# Patient Record
Sex: FEMALE | Race: WHITE | Employment: OTHER | URBAN - METROPOLITAN AREA
[De-identification: names, ages, dates, MRNs, and addresses within clinical notes are randomized per-mention and may not be internally consistent; named-entity substitution may affect disease eponyms.]

---

## 2017-02-01 ENCOUNTER — HOSPITAL ENCOUNTER (OUTPATIENT)
Dept: PHYSICAL THERAPY | Age: 82
Discharge: HOME | End: 2017-02-01
Payer: MEDICARE

## 2017-02-01 PROCEDURE — G8978 MOBILITY CURRENT STATUS: HCPCS

## 2017-02-01 PROCEDURE — 97140 MANUAL THERAPY 1/> REGIONS: CPT

## 2017-02-01 PROCEDURE — 97162 PT EVAL MOD COMPLEX 30 MIN: CPT

## 2017-02-01 PROCEDURE — G8979 MOBILITY GOAL STATUS: HCPCS

## 2017-02-01 NOTE — PROGRESS NOTES
PHYSICAL THERAPY - DAILY TREATMENT NOTE    Patient Name: Ni France        Date: 2017  : 1926    Patient  Verified: YES  Visit #:   1   of   8  Insurance: Payor: Tristan Cody / Plan: [place] MED[place]ARE PART A & B / Product Type: Medicare /      In time: 105 Out time: 150   Total Treatment Time: 45     Medicare Time Tracking (below)   Total Timed Codes (min):  10 1:1 Treatment Time:  10     TREATMENT AREA/ DIAGNOSIS = Low back pain [M54.5]    SUBJECTIVE  Pain Level (on 0 to 10 scale):  0  / 10   Medication Changes/New allergies or changes in medical history, any new surgeries or procedures?     NO    If yes, update Summary List   Subjective Functional Status/Changes:  []  No changes reported     Please see eval.      OBJECTIVE  Modalities Rationale: [] decrease edema/ inflammation  [] decrease pain   [] increase tissue extensibility  [] increase muscle performance  [] decrease neural compromise  to improve patient's ability to [] perform ADLs   [] ambulate  [] perform work  [] relaxation/ sleep      min [] Estim, type/location:                                      []  att     []  unatt     []  w/US     []  w/ice    []  w/heat    min []  Mechanical Traction: type/lbs                   []  pro   []  sup   []  int   []  cont    []  before manual    []  after manual    min []  Ultrasound, settings/location:      min []  Iontophoresis w/ dexamethasone, location:                                               []  take home patch       []  in clinic    min []  Ice     []  Heat    location/position:     min []  Vasopneumatic Device, press/temp:     min []  Other:    [] Skin assessment post-treatment (if applicable):    []  intact    []  redness- no adverse reaction     []redness  adverse reaction:          min Therapeutic Exercise:  [x]  See flow sheet   Rationale:  [] increase ROM   [] increase strength   [] increase endurance   [] increase motor control   [] other  to improve patients ability to [] perform ADLs   [] ambulate  [] perform work  [] relaxation/ sleep      10 min Manual Therapy: Technique:         [] STM[]ASTM[x]TPR[]PROM[] Stretching  []Jt manipulation []Gr I [] II []  III [] IV[] V[]  Treatment Area:  R glute max TPR  Other:   Rationale: [x] decrease pain   [x] decrease TP [] increase ROM/ mobility   [] increase tissue extensibility   [] decrease edema   [] reduce disc [] postural correction   [] other     to improve patient's ability to [x] perform ADLs   [x] ambulate  [] perform work  [] relaxation/ sleep       min Therapeutic Activity:  [] see flow sheet   Rationale:[] increase ROM   [] increase strength   [] increase balance/ proprioception   [] increase motor control   [] other   to improve patients ability to [] perform ADLs   [] ambulate  [] perform work  [] relaxation/ sleep      min Neuromuscular Re-ed:  [] see flow sheet   Rationale:[] increase ROM   [] increase strength   [] increase balance/ proprioception   [] increase motor control  [] improve safety  [] other    to improve patients ability to[] perform ADLs   [] ambulate  [] perform work  [] relaxation/ sleep                min Gait Training: To improve patients ability to:    [] perform ADLs   [] ambulate       min Patient Education:  Yes    [x] Reviewed HEP   []  Progressed/Changed HEP based on: Other Objective/Functional Measures:    Please see POC. Post Treatment Pain Level (on 0 to 10) scale:   0  / 10     ASSESSMENT  []  See Progress Note/Recertification      Patient will continue to benefit from skilled PT services to:  PLEASE SEE POC/SET GOALS.    Progress toward goals / Updated goals:    [] decr pain   []inc stability   []inc balance [] inc ROM[] inc strength  [] centralizing symptoms                    [] progressing  Function  [] progressing towards LTGs            []  progressing HEP       PLAN  [x]  Upgrade activities as tolerated YES Continue plan of care   []  Discharge due to :    []  Other:      Therapist: Ileana Jeffers PT, DPT, MTC, CMTPT    Date: 2/1/2017 Time: 2:54 PM        No future appointments.

## 2017-02-01 NOTE — PROGRESS NOTES
[facility] PHYSICAL THERAPY AT [facility] [address] [place] [facility] - Phone: [phone]  Fax: [phone] / [address] [facility]  Patient Name: Ni France : 1926   Medical   Diagnosis: Low back pain [M54.5] Treatment Diagnosis: LBP, L knee pain   Onset Date: Chronic LBP, knee pain 3 mo. ago     Referral Source: Hannah Ponce [facility]): 2017   Prior Hospitalization: See medical history Provider #: [id number]   Prior Level of Function: Tolerable LBP   Comorbidities: OP, arthritis, pacemaker, alcohol use, HTN, CA   Medications: Verified on Patient Summary List   The Plan of Care and following information is based on the information from the initial evaluation.   ===========================================================================================  Assessment / key information:  The patient is a 80 y.o. female who presents to the clinic with main c/o R-sided LBP with ambulation > 2/3 of a block, and L knee pain x 3 mos. Exacerbated by stairs, getting out of a chair. One recent incident of 10/10 back pain that felt like a spasm and lasted for 2-3 days after attempting to get in bed. Pain level is reported as 10/10 at max and 0/10 at best. Patient experiences no LBP with sitting. Objective evaluation reveals: (1) FOTO=TBD. (2) gait: L hip in ER, use of adductors for forward limb advancement, L trunk lean during L stance, antalgic. (3) lumbar AROM: WFL without pain, patient reports lumbar FLX \"feels good\". (4) fxl mobility: sit to stand limited by LE strength and L knee pain. (5) strength: B glute meds 3/5. TrPs present in R glutes and L quad. Would like to address L knee pain under this course of care. These impairments are preventing patient from full participation in an exercise program of walking, limiting walking tolerance.  The patient would benefit from skilled physical therapy services in order to decrease pain and improve activity tolerance and functional mobility.   ===========================================================================================  Eval Complexity: History MEDIUM  Complexity : 1-2 comorbidities / personal factors will impact the outcome/ POC ;  Examination  HIGH Complexity : 4+ Standardized tests and measures addressing body structure, function, activity limitation and / or participation in recreation ; Presentation LOW Complexity : Stable, uncomplicated ;  Decision Making Other outcome measures Professional Judgment  MEDIUM; Overall Complexity LOW   Problem List: pain affecting function, decrease ROM, decrease strength, impaired gait/ balance, decrease ADL/ functional abilitiies, decrease activity tolerance and decrease transfer abilities FOTO = TBD  Treatment Plan may include any combination of the following: Therapeutic exercise, Therapeutic activities, Neuromuscular re-education, Physical agent/modality, Gait/balance training, Manual therapy, Patient education, Self Care training, Functional mobility training and Stair training  Patient / Family readiness to learn indicated by: asking questions, trying to perform skills and interest  Persons(s) to be included in education: patient (P) and family support person (FSP);list NA  Barriers to Learning/Limitations: None  Measures taken: NA   Patient Goal (s): \"less discomfort\"   Patient self reported health status: excellent  Rehabilitation Potential: good   Short Term Goals: To be accomplished in  2-3  weeks:  1. Establish HEP and consistent compliance with instructions. 2. Decrease max pain to < or = 6/10 for activity tolerance. 3. The patient will be able to stand up from chair 3x without UE assistance.  Long Term Goals: To be accomplished in  4-6  weeks:  1. The patient will be independent in HEP in preparation for discharge.   2. Improve functional ability as evidenced by a score improvement of > or = 6 points on FOTO. 3. Decrease man pain to < or = 3/10 for activity tolerance. 4. The patient will be able ot stand up from chair 7x without UE assistance. 5. The patient will be able to walk > or = 1 block without symptoms for ambulating for exercise program.   Frequency / Duration:   Patient to be seen  2  times per week for 4-6  weeks:  Patient / Caregiver education and instruction: self care, activity modification and exercises  G-Codes (GP): Mobility: [id number] Current  CK= 40-59%   Goal  CJ= 20-39%. The severity rating is based on the Other Professional judgment    Therapist Signature: Soco Woods, PT, DPT, MTC, CMTPT Date: 5/9/5685   Certification Period: 2/1/17 to 4/29/17 Time: 2:55 PM   ===========================================================================================  I certify that the above Physical Therapy Services are being furnished while the patient is under my care. I agree with the treatment plan and certify that this therapy is necessary. Physician Signature:        Date:       Time:     Please sign and return to [facility] [place] or you may fax the signed copy to [phone]. Thank you.

## 2017-02-06 ENCOUNTER — HOSPITAL ENCOUNTER (OUTPATIENT)
Dept: PHYSICAL THERAPY | Age: 82
Discharge: HOME | End: 2017-02-06
Payer: MEDICARE

## 2017-02-06 PROCEDURE — 97110 THERAPEUTIC EXERCISES: CPT

## 2017-02-06 PROCEDURE — 97140 MANUAL THERAPY 1/> REGIONS: CPT

## 2017-02-06 NOTE — PROGRESS NOTES
PHYSICAL THERAPY - DAILY TREATMENT NOTE      Patient Name: Ni France        Date: 2017  : 1926   YES Patient  Verified  Visit #:   2  of   8  Insurance: Payor: Wilfrid Moscoso / Plan: [place] MEDICARE PART A & B / Product Type: Medicare /      In time: 300 Out time: 350   Total Treatment Time: 50     Medicare Time Tracking (below)   Total Timed Codes (min):  50 1:1 Treatment Time:  40     TREATMENT AREA =  R LBP, L knee pain. SUBJECTIVE    Pain Level (on 0 to 10 scale):  4  / 10   Medication Changes/New allergies or changes in medical history, any new surgeries or procedures? NO    If yes, update Summary List   Subjective Functional Status/Changes:  []  No changes reported       Functional improvements: pt states difficulty walking today from L knee pain. Functional impairments: difficulty with sit<>stand and ascending stairs x 4. OBJECTIVE       30, 15 min 1:1 min Therapeutic Exercise:  [x]  See flow sheet   Rationale:      increase ROM and increase strength to improve the patients ability to transfer sit<>stand        15 min Manual Therapy: Technique:      [x] S/DTM [x]IASTM []PROM [] Passive Stretching   [x]manual TPR    []Jt manipulation:Gr I [] II []  III [] IV[] V[]  Treatment Area:  R gluteals in S/L, L knee IASTM quad and infrapatellar region   Rationale:      decrease pain, increase ROM and increase tissue extensibility to improve WB for transfers       min Gait Training:    Rationale:        5 min Patient Education:  YES  Reviewed HEP   []  Progressed/Changed HEP based on: Other Objective/Functional Measures: Added gluteal strengthening and lumbar flexion stretching to there ex program and discussed with addition to HEP. Post Treatment Pain Level (on 0 to 10) scale:   2   10     ASSESSMENT    Assessment/Changes in Function:     Pt with compensation noted during lateral band walk, improved with shorter strides.      []  See Progress Note/Recertification   Patient will continue to benefit from skilled PT services to modify and progress therapeutic interventions, address functional mobility deficits, address ROM deficits, address strength deficits, analyze and address soft tissue restrictions, analyze and cue movement patterns, analyze and modify body mechanics/ergonomics, assess and modify postural abnormalities and instruct in home and community integration to attain remaining goals. to attain remaining goals. Progress toward goals / Updated goals: · Short Term Goals: To be accomplished in 2-3 weeks:  1. Establish HEP and consistent compliance with instructions--50% met, will need further verbal and tactile cues to meet goal.     PLAN    []  Upgrade activities as tolerated YES Continue plan of care   []  Discharge due to :    [x]  Other: Add distraction for L knee at next visit. Emphasis continues on gluteal strengthening.      Therapist: Chloé Gonzalez PT    Date: 2/6/2017 Time: 2:35 PM   Future Appointments  Date Time Provider Lana Sharma   2/6/2017 3:00 PM Lucia Samson [facility]   2/8/2017 3:30 PM Armani Zuñiga PT [facility] AT [facility]

## 2017-02-08 ENCOUNTER — HOSPITAL ENCOUNTER (OUTPATIENT)
Dept: PHYSICAL THERAPY | Age: 82
Discharge: HOME | End: 2017-02-08
Payer: MEDICARE

## 2017-02-08 PROCEDURE — 97140 MANUAL THERAPY 1/> REGIONS: CPT

## 2017-02-08 PROCEDURE — 97110 THERAPEUTIC EXERCISES: CPT

## 2017-02-08 NOTE — PROGRESS NOTES
PHYSICAL THERAPY - DAILY TREATMENT NOTE    Patient Name: Ni France        Date: 2017  : 1926   YES Patient  Verified  Visit #:   3      8  Insurance: Payor: Karina Bee / Plan: [place] MEDICARE PART A & B / Product Type: Medicare /      In time: 335 Out time: 430   Total Treatment Time: 55     TREATMENT AREA =  Low back pain [M54.5]    SUBJECTIVE  Pain Level (on 0 to 10 scale):  5-6  / 10   Medication Changes/New allergies or changes in medical history, any new surgeries or procedures? NO    If yes, update Summary List   Subjective Functional Status/Changes:  []  No changes reported     Functional improvements: back feels looser than the other day while stretching  Functional impairments: walking, L knee and R hip pain       OBJECTIVE    15 min Manual Therapy: Technique:      [] S/DTM [x]IASTM []PROM [] Passive Stretching   [x]manual TPR  [] TDN (see objective)  [x]Jt manipulation:Gr I [] II []  III [] IV[x] V[]  Treatment/Area:  L knee IASTM to peripatellar area, sup glides L patella, manual TPR to R glute max and med in s/l, manual assist for eccentric lowering R glute med   Rationale:      decrease pain, increase ROM and decrease trigger points to improve patient's ability to ambulate    40 total, 15 1:1 min Therapeutic Exercise:  [x]  See flow sheet   Rationale:      increase ROM and increase strength to improve the patients ability to ambulate, sit to stand        min Patient Education:  YES  Reviewed HEP   []  Progressed/Changed HEP based on:         Other Objective/Functional Measures:    Able to tolerate eccentric hip ABD with assist, UA to abduct R hip in side-lying against gravity, uses TFL to compensate    Patient inquires as so the cause/effect relationship of her L LE swelling and the knee pain, which came first? She was told that the vein stripping she received years ago could cause LE edema later on     Post Treatment Pain Level (on 0 to 10) scale:   2  / 10 ASSESSMENT  Assessment/Changes in Function:     Able to decrease pain and compensations with verbal cuing and manual therapy     []  See Progress Note/Recertification   Patient will continue to benefit from skilled PT services to modify and progress therapeutic interventions, address functional mobility deficits, address strength deficits, analyze and address soft tissue restrictions, analyze and cue movement patterns, analyze and modify body mechanics/ergonomics and instruct in home and community integration to attain remaining goals. Progress toward goals / Updated goals:    1. Establish HEP and consistent compliance with instructions. 2. Decrease max pain to < or = 6/10 for activity tolerance. 3. The patient will be able to stand up from chair 3x without UE assistance.       PLAN  [x]  Upgrade activities as tolerated YES Continue plan of care   []  Discharge due to :    []  Other:      Therapist: Edda Box, PT, DPT, MTC, CMTPT    Date: 2/8/2017 Time: 3:41 PM     Future Appointments  Date Time Provider Lana Sharma   2/15/2017 1:00 PM Lucia Aragon [facility] AT [facility]   2/17/2017 9:30 AM Edel Daugherty, PT [facility] AT [facility]

## 2017-02-14 ENCOUNTER — HOSPITAL ENCOUNTER (OUTPATIENT)
Dept: PHYSICAL THERAPY | Age: 82
Discharge: HOME | End: 2017-02-14
Payer: MEDICARE

## 2017-02-14 PROCEDURE — 97110 THERAPEUTIC EXERCISES: CPT

## 2017-02-14 NOTE — PROGRESS NOTES
PHYSICAL THERAPY - DAILY TREATMENT NOTE      Patient Name: Ni France        Date: 2017  : 1926   YES Patient  Verified  Visit #:   4   of   8  Insurance: Payor: Marian Hough / Plan: [place] MEDICARE PART A & B / Product Type: Medicare /      In time: 335 Out time: 430   Total Treatment Time: 55     Medicare Time Tracking (below)   Total Timed Codes (min):  40 1:1 Treatment Time:  40     TREATMENT AREA =  Low back pain [M54.5]    SUBJECTIVE    Pain Level (on 0 to 10 scale):  0 at rest, sitting; pain with walking in L knee  / 10   Medication Changes/New allergies or changes in medical history, any new surgeries or procedures?     NO    If yes, update Summary List   Subjective Functional Status/Changes:  []  No changes reported       Functional improvements: \"back feels pretty good\", knee hurts  Functional impairments: pain with ambulation, stairs         OBJECTIVE  Modalities Rationale:     decrease pain and increase tissue extensibility to improve patient's ability to ambulate      min [] Estim, type/location:                                      []  att     []  unatt     []  w/US     []  w/ice    []  w/heat    min []  Mechanical Traction: type/lbs                   []  pro   []  sup   []  int   []  cont    []  before manual    []  after manual    min []  Ultrasound, settings/location:      min []  Iontophoresis w/ dexamethasone, location:                                               []  take home patch       []  in clinic   15 min []  Ice     [x]  Heat    location/position: L s/l to R hip    min []  Vasopneumatic Device, press/temp:     min []  Other:    [x] Skin assessment post-treatment (if applicable):    [x]  intact    [x]  redness- no adverse reaction     []redness  adverse reaction:      40 min Therapeutic Exercise:  [x]  See flow sheet   Rationale:      increase strength and increase proprioception to improve the patients ability to ambulate      min Patient Education:  YES  Reviewed HEP   [] Progressed/Changed HEP based on: Other Objective/Functional Measures:    Issued HEP for focus on glute med and quad strengthening     Post Treatment Pain Level (on 0 to 10) scale:   0  / 10     ASSESSMENT    Assessment/Changes in Function:     Increasing activity tolerance according to LBP, no change in L knee  Weakness B LEs as evidenced by sit to stand weakness     []  See Progress Note/Recertification   Patient will continue to benefit from skilled PT services to modify and progress therapeutic interventions, address functional mobility deficits, address ROM deficits, address strength deficits, analyze and address soft tissue restrictions, analyze and cue movement patterns, analyze and modify body mechanics/ergonomics and instruct in home and community integration to attain remaining goals. to attain remaining goals. Progress toward goals / Updated goals:    1. Establish HEP and consistent compliance with instructions. 2. Decrease max pain to < or = 6/10 for activity tolerance. --<6/10 pain today  3. The patient will be able to stand up from chair 3x without UE assistance.       PLAN    [x]  Upgrade activities as tolerated YES Continue plan of care   []  Discharge due to :    []  Other:      Therapist: Soco Woods, PT, DPT, MTC, CMTPT    Date: 2/14/2017 Time: 3:30 PM   Future Appointments  Date Time Provider Lana Sharma   2/17/2017 9:30 AM Eva Martinez, PT [facility] AT [facility]   2/21/2017 12:30 PM Chloé Gonzalez, PT [facility] AT [facility]   2/24/2017 9:30 AM Eva Martinez, PT [facility] AT [facility]   3/1/2017 11:00 AM Eva Martinez, PT [facility] AT [facility]   3/3/2017 10:30 AM Eva Martinez, PT [facility] AT [facility]   3/6/2017 10:00  [address], PT [facility] AT [facility]   3/10/2017 3:30 PM Chloé Gonzalez, PT [facility] AT [facility]

## 2017-02-15 ENCOUNTER — APPOINTMENT (OUTPATIENT)
Dept: PHYSICAL THERAPY | Age: 82
End: 2017-02-15
Payer: MEDICARE

## 2017-02-17 ENCOUNTER — HOSPITAL ENCOUNTER (OUTPATIENT)
Dept: PHYSICAL THERAPY | Age: 82
Discharge: HOME | End: 2017-02-17
Payer: MEDICARE

## 2017-02-17 PROCEDURE — 97140 MANUAL THERAPY 1/> REGIONS: CPT

## 2017-02-17 NOTE — PROGRESS NOTES
PHYSICAL THERAPY - DAILY TREATMENT NOTE    Patient Name: Ni France        Date: 2017  : 1926   YES Patient  Verified  Visit #:   5   of   8  Insurance: Payor: Renee Palm / Plan: [place] MEDICARE PART A & B / Product Type: Medicare /      In time: 0 Out time:    Total Treatment Time: 60     Medicare Time Tracking (below)   Total Timed Codes (min):  60 1:1 Treatment Time:  25     TREATMENT AREA =  Low back pain [M54.5]    SUBJECTIVE  Pain Level (on 0 to 10 scale):  0 at rest  / 10   Medication Changes/New allergies or changes in medical history, any new surgeries or procedures? NO    If yes, update Summary List   Subjective Functional Status/Changes:  []  No changes reported     Functional improvements: performing stretching at home  Functional impairments: pain and difficulty with sit to stand       OBJECTIVE    25  1:1 min Manual Therapy: Technique:      [x] S/DTM [x]IASTM []PROM [] Passive Stretching   [x]manual TPR  [] TDN (see objective)  []Jt manipulation:Gr I [] II []  III [] IV[] V[]  Treatment/Area:  L knee and R lumbar/hip   Rationale:      decrease pain, increase ROM, increase tissue extensibility and decrease trigger points to improve patient's ability to sit to stand without pain    35 NB min Therapeutic Exercise:  [x]  See flow sheet   Rationale:      increase ROM and increase strength to improve the patients ability to sit to stand, ambulate      min Patient Education:  YES  Reviewed HEP   []  Progressed/Changed HEP based on:         Other Objective/Functional Measures:    TTP of tissue at sup lat corner of patella  Patient continues to report snapping during knee FLX     Post Treatment Pain Level (on 0 to 10) scale:   0  / 10     ASSESSMENT  Assessment/Changes in Function:     Slow progress with sit to stand mobility     []  See Progress Note/Recertification   Patient will continue to benefit from skilled PT services to modify and progress therapeutic interventions to attain remaining goals. Progress toward goals / Updated goals:    1. Establish HEP and consistent compliance with instructions. 2. Decrease max pain to < or = 6/10 for activity tolerance. --<6/10 pain today  3. The patient will be able to stand up from chair 3x without UE assistance.       PLAN  [x]  Upgrade activities as tolerated YES Continue plan of care   []  Discharge due to :    []  Other:      Therapist: Abiola Mcclain, PT, DPT, MTC, CMTPT    Date: 2/17/2017 Time: 9:32 AM     Future Appointments  Date Time Provider Lana Sharma   2/21/2017 12:30 PM Lien Renner PT [facility] AT [facility]   2/24/2017 9:30 AM Armani Zuñiga, PT [facility] AT [facility]   3/1/2017 11:00 AM Armani Zuñiga, PT [facility] AT [facility]   3/3/2017 10:30 AM Armani Zuñiga PT [facility] AT [facility]   3/6/2017 10:00 AM Lien Renner PT [facility] AT [facility]   3/10/2017 3:30 PM Lien Renner, PT [facility] AT [facility]

## 2017-02-21 ENCOUNTER — HOSPITAL ENCOUNTER (OUTPATIENT)
Dept: PHYSICAL THERAPY | Age: 82
Discharge: HOME | End: 2017-02-21
Payer: MEDICARE

## 2017-02-21 PROCEDURE — 97110 THERAPEUTIC EXERCISES: CPT

## 2017-02-21 PROCEDURE — 97140 MANUAL THERAPY 1/> REGIONS: CPT

## 2017-02-21 NOTE — PROGRESS NOTES
PHYSICAL THERAPY - DAILY TREATMENT NOTE    Patient Name: Ni France        Date: 2017  : 1926   YES Patient  Verified  Visit #:   6   of   8  Insurance: Payor: Dharmesh Barlow / Plan: [place] MED[place] & B / Product Type: Medicare /      In time: 90 Out time: 120   Total Treatment Time: 70     Medicare Time Tracking (below)   Total Timed Codes (min):  60 1:1 Treatment Time:  40     TREATMENT AREA =  Low back pain [M54.5]    SUBJECTIVE  Pain Level (on 0 to 10 scale): 3  / 10--R lumbar and L knee pain   Medication Changes/New allergies or changes in medical history, any new surgeries or procedures? NO    If yes, update Summary List   Subjective Functional Status/Changes:  []  No changes reported     Functional improvements: pt reporting knee pain is most limiting factor in mobility today. Has not been compliant with HEP. Functional impairments: difficulty with sit<>Stand, ambulation on uneven surfaces.         OBJECTIVE  Modalities Rationale:   Decrease pain to improve patient's ability to ambulate with improved WB B LE.     min [] Estim, type/location:                                      []  att     []  unatt     []  w/US     []  w/ice    []  w/heat    min []  Mechanical Traction: type/lbs                   []  pro   []  sup   []  int   []  cont    []  before manual    []  after manual    min []  Ultrasound, settings/location:      min []  Iontophoresis w/ dexamethasone, location:                                               []  take home patch       []  in clinic   10 min []  Ice     [x]  Heat    location/position: Sidelying to lumbar and R hip    min []  Vasopneumatic Device, press/temp:     min []  Other:    [x] Skin assessment post-treatment (if applicable):    [x]  intact    [x]  redness- no adverse reaction     []redness  adverse reaction:    25    min Manual Therapy: Technique:      [x] S/DTM [x]IASTM []PROM [] Passive Stretching   [x]manual TPR  [] TDN (see objective)  [x]Jt manipulation:Gr I [x] II [x]  III [] IV[] V[]  Treatment/Area:  L knee and R lumbar, R hip lateral and posterior, LAD, knee ext and flexion patellar glides. Rationale:      decrease pain, increase ROM, increase tissue extensibility and decrease trigger points to improve patient's ability to sit to stand without pain    35/ 1:1 15 min min Therapeutic Exercise:  [x]  See flow sheet   Rationale:      increase ROM and increase strength to improve the patients ability to sit to stand, ambulate      min Patient Education:  YES  Reviewed HEP   []  Progressed/Changed HEP based on: Other Objective/Functional Measures:    B gluteal strength at 3/5, addressed firing patterns t/o tx. Post Treatment Pain Level (on 0 to 10) scale:  2 at L knee and R hip, lumbar  / 10     ASSESSMENT  Assessment/Changes in Function:   Gradual progression noted with transfer sit<>stand, gait with reduction in compensation. []  See Progress Note/Recertification   Patient will continue to benefit from skilled PT services to modify and progress therapeutic interventions to attain remaining goals. Progress toward goals / Updated goals:    1. Establish HEP and consistent compliance with instructions--pt is not consistent per pt. 2. Decrease max pain to < or = 6/10 for activity tolerance. --<6/10 pain today, goal met x 1 day. 3. The patient will be able to stand up from chair 3x without UE assistance--pt at 1 trial currently met without use of UE, increased height, at 22 inches.       PLAN  [x]  Upgrade activities as tolerated YES Continue plan of care   []  Discharge due to :    []  Other:      Therapist: Barbara Escalante PT, Shaquille SIERRA 62      Date: 2/21/2017 Time: 1249p     Future Appointments  Date Time Provider Lana Sharma   2/24/2017 9:30 AM Brandi Dave PT REHAB CENTER AT [facility]   3/1/2017 11:00 AM Brandi Dave PT RE[facility]AB [facility] AT [facility]   3/3/2017 10:30 AM Brandi Dave PT REHAB CENTER AT [facility]   3/6/2017 10:00  [address], PT REHAB CENTER AT [facility] 3/10/2017 3:30  [address], PT REHAB CENTER AT [facility]

## 2017-02-24 ENCOUNTER — HOSPITAL ENCOUNTER (OUTPATIENT)
Dept: PHYSICAL THERAPY | Age: 82
Discharge: HOME | End: 2017-02-24
Payer: MEDICARE

## 2017-02-24 PROCEDURE — 97110 THERAPEUTIC EXERCISES: CPT

## 2017-02-24 PROCEDURE — 97140 MANUAL THERAPY 1/> REGIONS: CPT

## 2017-02-24 NOTE — PROGRESS NOTES
PHYSICAL THERAPY - DAILY TREATMENT NOTE    Patient Name: Ni France        Date: 2017  : 1926   YES Patient  Verified  Visit #:   7   of   8  Insurance: Payor: [employer] / Plan: [place] MEDICARE PART A & B / Product Type: Medicare /      In time: 444 Out time: 1030   Total Treatment Time: 55     Medicare Time Tracking (below)   Total Timed Codes (min):  45 1:1 Treatment Time:  45     TREATMENT AREA =  Low back pain [M54.5]    SUBJECTIVE  Pain Level (on 0 to 10 scale):  0  / 10   Medication Changes/New allergies or changes in medical history, any new surgeries or procedures?     NO    If yes, update Summary List   Subjective Functional Status/Changes:  []  No changes reported     Functional improvements: no pain since Cortisone injection on Tuesday, improved walking  Functional impairments: sit to stand, fxl mobility        OBJECTIVE    25 min Manual Therapy: Technique:      [x] S/DTM []IASTM []PROM [x] Passive Stretching   [x]manual TPR  [] TDN (see objective)  []Jt manipulation:Gr I [] II []  III [] IV[] V[]  Treatment/Area:  L knee CFM to patellar tendon, MFR around patella, TPR to L quad, Ant glide of tib on fib in h/l; TPR to R QL and glutes in L s/l, passive hip flexor stretch   Rationale:      decrease pain, increase ROM, increase tissue extensibility and decrease trigger points to improve patient's ability to ambulate    20 min Therapeutic Exercise:  [x]  See flow sheet   Rationale:      increase ROM and increase strength to improve the patients ability to ambulate, sit to stand      Modalities Rationale:     decrease pain and increase tissue extensibility to improve patient's ability to sit to stand, ambulate   min [] Estim, type/location:                                      []  att     []  unatt     []  w/US     []  w/ice    []  w/heat    min []  Mechanical Traction: type/lbs                   []  pro   []  sup   []  int   []  cont    []  before manual    []  after manual    min [] Ultrasound, settings/location:      min []  Iontophoresis w/ dexamethasone, location:                                               []  take home patch       []  in clinic   10 min []  Ice     [x]  Heat    location/position: L s/l    min []  Vasopneumatic Device, press/temp:     min []  Other:    [x] Skin assessment post-treatment (if applicable):    [x]  intact    [x]  redness- no adverse reaction     []redness  adverse reaction:         min Patient Education:  YES  Reviewed HEP   []  Progressed/Changed HEP based on: Other Objective/Functional Measures:    Patient reports no pain in L knee since cortisone injection (only lasted 2 days the last time patient received one)  Most TTP in R glute max, maricarmen just lateral to glute max  Incision site from R THR , encouraged patient to perform scar massage after showering     Post Treatment Pain Level (on 0 to 10) scale:   0  / 10     ASSESSMENT  Assessment/Changes in Function:     Compensations for L knee pain a contributing factor to R LBP  Post lat approach for R THR   []  See Progress Note/Recertification   Patient will continue to benefit from skilled PT services to modify and progress therapeutic interventions to attain remaining goals. Progress toward goals / Updated goals:    1. Establish HEP and consistent compliance with instructions--pt is not consistent per pt. 2. Decrease max pain to < or = 6/10 for activity tolerance. --<6/10 pain today, goal met x 1 day. 3. The patient will be able to stand up from chair 3x without UE assistance--table height at 21 inches.       PLAN  [x]  Upgrade activities as tolerated YES Continue plan of care   []  Discharge due to :    []  Other:      Therapist: Audley Mortimer, PT, DPT, MTC, CMTPT    Date: 2/24/2017 Time: 9:31 AM     Future Appointments  Date Time Provider Lana Sharma   3/1/2017 11:00 AM Jessie Laurent PT REHAB CENTER AT [address]   3/3/2017 10:30 AM Jessie Laurent PT REHAB CENTER AT [address]   3/6/2017 10:00 AM Edel Thomas Night REHAB CENTER AT [facility]   3/10/2017 3:30  [address], PT REHAB CENTER AT [facility]

## 2017-03-01 ENCOUNTER — HOSPITAL ENCOUNTER (OUTPATIENT)
Dept: PHYSICAL THERAPY | Age: 82
Discharge: HOME | End: 2017-03-01
Payer: MEDICARE

## 2017-03-01 PROCEDURE — G8979 MOBILITY GOAL STATUS: HCPCS

## 2017-03-01 PROCEDURE — 97140 MANUAL THERAPY 1/> REGIONS: CPT

## 2017-03-01 PROCEDURE — G8978 MOBILITY CURRENT STATUS: HCPCS

## 2017-03-01 NOTE — PROGRESS NOTES
PHYSICAL THERAPY - DAILY TREATMENT NOTE    Patient Name: Ni France        Date: 3/1/2017  : 1926   YES Patient  Verified  Visit #:     Insurance: Payor: Edgar Signs / Plan: [place] MEDICARE PART A & B / Product Type: Medicare /      In time: 2 Out time: 1150   Total Treatment Time: 45     Medicare Time Tracking (below)   Total Timed Codes (min):  45 1:1 Treatment Time:  30     TREATMENT AREA =  Low back pain [M54.5]    SUBJECTIVE  Pain Level (on 0 to 10 scale):  0  / 10   Medication Changes/New allergies or changes in medical history, any new surgeries or procedures? NO    If yes, update Summary List   Subjective Functional Status/Changes:  []  No changes reported     Functional improvements: less swelling in L LE  Functional impairments: walking tolerance       OBJECTIVE    25 1:1 min Manual Therapy: Technique:      [x] S/DTM []IASTM []PROM [x] Passive Stretching   [x]manual TPR  [] TDN (see objective)  []Jt manipulation:Gr I [] II []  III [] IV[] V[]  Treatment/Area:  L knee and quad STM/retrograde effleurage to decrease swelling and improve patellar mobility; manual TPR to R QL and glutes f/b passive QL stretching   Rationale:      decrease pain, increase ROM, increase tissue extensibility and decrease trigger points to improve patient's ability to ambulate, perform fxl mobility     20  5 1:1 NB min Therapeutic Exercise:  [x]  See flow sheet   Rationale:      increase ROM and increase strength to improve the patients ability to ambulate, perform fxl mobility      min Patient Education:  YES  Reviewed HEP   []  Progressed/Changed HEP based on: Other Objective/Functional Measures:     It \"feels better\"  Able to get in and out of kitchen chair (firm surface) without use of UEs, still trouble with soft cushiony surfaces  As per patient, manual therapy has significantly reduced L knee and LE swelling  Able to perform 25 sit<-->stands at 21 inch seat height without UE assistance   Post Treatment Pain Level (on 0 to 10) scale:   0  / 10     ASSESSMENT  Assessment/Changes in Function:     See PN     [x]  See Progress Note/Recertification   Patient will continue to benefit from skilled PT services to modify and progress therapeutic interventions to attain remaining goals.    Progress toward goals / Updated goals:    See PN     PLAN  [x]  Upgrade activities as tolerated YES Continue plan of care   []  Discharge due to :    []  Other:      Therapist: Laura Collins, PT, DPT, MTC, CMTPT    Date: 3/1/2017 Time: 11:07 AM     Future Appointments  Date Time Provider Lana Sharma   3/3/2017 10:30 AM Aletha Mahmood PT REHAB CENTER AT [facility]   3/6/2017 10:00 AM Marianne Johnson PT REHAB CENTER AT [facility]   3/10/2017 3:30 PM Marianne Johnson PT REHAB CENTER AT [facility]

## 2017-03-01 NOTE — PROGRESS NOTES
[facility] PHYSICAL THERAPY AT [facility] [address] [facility] [address]  Phone: [phone]  Fax: [phone]          Patient Name: Jacqui Kenyon : 1926   Treatment/Medical Diagnosis: Low back pain [M54.5]   Onset Date: Chronic LBP, knee pain 3 mos. ago    Referral Source: Melissa Medley [employer] [facility]): 17   Prior Hospitalization: See Medical History Provider #: [id number]   Prior Level of Function: Tolerable LBP   Comorbidities: OP, arthritis, pacemaker, alcohol use, HTN, CA   Medications: Verified on Patient Summary List   Visits from [facility]: 8 Missed Visits: 0     GOALS:  1. The patient will be independent in HEP in preparation for discharge. 2. Improve functional ability as evidenced by a score improvement of > or = 6 points on FOTO. 3. Decrease man pain to < or = 3/10 for activity tolerance. 4. The patient will be able ot stand up from chair 7x without UE assistance. 5. The patient will be able to walk > or = 1 block without symptoms for ambulating for exercise program.     Patient verbalizes understanding of HEP, but has inconsistent compliance  FOTO not formally reassessed  Patient has been pain free in L knee since cortisone injection  Patient able to perform 25 sit <--> stands from firm 21\" seat height without UE assistance  Patient has not attempted prolonged walking, is discouraged or turned off by walking exercise program due to chronic LBP    Key Functional Changes/Progress: Patient reports \"feeling better' and activity tolerance improvement following manual therapies. She has been pain free in L knee since receiving injection and is progressing in all PREs for LE strength. She continues to be inconsistently compliant with HEP.   Problem List: pain affecting function, decrease strength, impaired gait/ balance, decrease ADL/ functional abilitiies, decrease activity tolerance and decrease transfer abilities   Treatment Plan may include any combination of the following: Therapeutic exercise, Therapeutic activities, Physical agent/modality, Gait/balance training, Manual therapy, Patient education, Self Care training, Functional mobility training, Home safety training and Stair training  Patient Goal(s) has been updated and includes:      Goals for this certification period include and are to be achieved in   4  weeks:  1. The patient will be independent in HEP in preparation for discharge. 2. Improve functional ability as evidenced by a score improvement of > or = 6 points on FOTO. 3. The patient will be able ot stand up from soft surface at < or = 18\" seat height > or = 5x without UE assistance. 4. The patient will be able to walk > or = 1 block without symptoms for ambulating for exercise program.   Frequency / Duration:   Patient to be seen   1-2   times per week for   4    weeks:  G-Codes (GP): Mobility: [id number] Current  CJ= 20-39%   Goal  CJ= 20-39%. The severity rating is based on Professional Judgment. Assessments/Recommendations: Continue skilled PT services as above for further progression towards LTGs and encouragement towards implementing regular walking routine for exercise. If you have any questions/comments please contact us directly at [phone]. Thank you for allowing us to assist in the care of your patient. Therapist Signature: Matt Ku, PT, DPT, MTC, CMTPT Date: 6/9/6570   Certification Period:  Reporting Period: 3/1/17 to 5/30/17 2/1/17 to current Time: 11:11 AM   NOTE TO PHYSICIAN:  Jayda Camejo 172 FAX TO   [facility]: [phone].   If you are unable to process this request in 24 hours please contact our office: [phone].    ___ I have read the above report and request that my patient continue as recommended.   ___ I have read the above report and request that my patient continue therapy with the following changes/special instructions: ________________________________________________   ___ I have read the above report and request that my patient be discharged from therapy.      Physician Signature:        Date:       Time:

## 2017-03-03 ENCOUNTER — HOSPITAL ENCOUNTER (OUTPATIENT)
Dept: PHYSICAL THERAPY | Age: 82
Discharge: HOME | End: 2017-03-03
Payer: MEDICARE

## 2017-03-03 PROCEDURE — 97140 MANUAL THERAPY 1/> REGIONS: CPT

## 2017-03-03 NOTE — PROGRESS NOTES
PHYSICAL THERAPY - DAILY TREATMENT NOTE    Patient Name: Ni France        Date: 3/3/2017  : 1926   YES Patient  Verified  Visit #:     Insurance: Payor: Baylee Looney / Plan: [place] MEDICARE PART A & B / Product Type: Medicare /      In time: 4660 Out time: 1120   Total Treatment Time: 50     Medicare Time Tracking (below)   Total Timed Codes (min):  50 1:1 Treatment Time:  15     TREATMENT AREA =  Low back pain [M54.5]    SUBJECTIVE  Pain Level (on 0 to 10 scale):   10   Medication Changes/New allergies or changes in medical history, any new surgeries or procedures? NO    If yes, update Summary List   Subjective Functional Status/Changes:  []  No changes reported     Functional improvements: L knee pain free, swelling down  Functional impairments: pain in R side of back since getting back in bed last night after a trip to the restroom        OBJECTIVE    15 1:1 min Manual Therapy: Technique:      [x] S/DTM []IASTM []PROM [] Passive Stretching   [x]manual TPR  [] TDN (see objective)  []Jt manipulation:Gr I [] II []  III [] IV[] V[]  Treatment/Area:  R lumbar   Rationale:      decrease pain, increase ROM, increase tissue extensibility and decrease trigger points to improve patient's ability to get in/out of bed without pain    35 NB min Therapeutic Exercise:  [x]  See flow sheet   Rationale:      increase ROM and increase strength to improve the patients ability to get in/out of bed without pain, sit to stand from soft surface      min Patient Education:  YES  Reviewed HEP   []  Progressed/Changed HEP based on: Other Objective/Functional Measures:    UA to perform sit to stands at 21inches today, performed 15x at 22inches. L knee pain with sit to stands. Patient showed low energy today with exercise.    Point tender at mid lateral iliac crest     Post Treatment Pain Level (on 0 to 10) scale:    10     ASSESSMENT  Assessment/Changes in Function:     Myofascial pain R hip, patient is a good needling candidate     []  See Progress Note/Recertification   Patient will continue to benefit from skilled PT services to modify and progress therapeutic interventions to attain remaining goals. Progress toward goals / Updated goals:    1. The patient will be independent in HEP in preparation for discharge. 2. Improve functional ability as evidenced by a score improvement of > or = 6 points on FOTO. 3. The patient will be able ot stand up from soft surface at < or = 18\" seat height > or = 5x without UE assistance.    4. The patient will be able to walk > or = 1 block without symptoms for ambulating for exercise program.      PLAN  [x]  Upgrade activities as tolerated YES Continue plan of care   []  Discharge due to :    []  Other:      Therapist: Nadia Hunter, PT, DPT, MTC, CMTPT    Date: 3/3/2017 Time: 10:31 AM     Future Appointments  Date Time Provider Lana Sharma   3/6/2017 10:00 AM Arnie Jhaveri PT REHAB CENTER AT [facility]   3/10/2017 3:30 PM Arnie Jhaveri PT REHAB CENTER AT [facility]

## 2017-03-06 ENCOUNTER — HOSPITAL ENCOUNTER (OUTPATIENT)
Dept: PHYSICAL THERAPY | Age: 82
Discharge: HOME | End: 2017-03-06
Payer: MEDICARE

## 2017-03-06 PROCEDURE — 97110 THERAPEUTIC EXERCISES: CPT

## 2017-03-06 PROCEDURE — 97140 MANUAL THERAPY 1/> REGIONS: CPT

## 2017-03-06 NOTE — PROGRESS NOTES
PHYSICAL THERAPY - DAILY TREATMENT NOTE      Patient Name: Ni Iverson        Date: 3/6/2017  : 1926   YES Patient  Verified  Visit #:   10   of   16  Insurance: Payor: Lilliam Bey / Plan: [place] MEDICARE PART A & B / Product Type: Medicare /      In time: 10 Out time: 11   Total Treatment Time: 60     Medicare Time Tracking (below)   Total Timed Codes (min):  50 1:1 Treatment Time:  25     TREATMENT AREA =LBP  SUBJECTIVE    Pain Level (on 0 to 10 scale):  3  / 10   Medication Changes/New allergies or changes in medical history, any new surgeries or procedures? NO    If yes, update Summary List   Subjective Functional Status/Changes:  []  No changes reported       Functional improvements: L knee pain significantly improved, LBP is still severe, felt better after last visit. Would like to try dry needling. Functional impairments: R sided LE weakness with functional transfers and ambulation. OBJECTIVE     35: 15 min billed min Therapeutic Exercise:  [x]  See flow sheet   Rationale:      increase ROM and increase strength to improve the patients ability to perform sit<>stand      15 min Manual Therapy: Technique:      [x] S/DTM []IASTM []PROM [] Passive Stretching   [x]manual TPR    []Jt manipulation:Gr I [] II []  III [] IV[] V[]  Treatment Area:  Lumbar paraspinals, gluteals in L S/L. Rationale:      increase ROM, increase tissue extensibility and decrease trigger points to improve patient's ability to transfer with appropriate WB thru B LE.          min Patient Education:  YES  Reviewed HEP   []  Progressed/Changed HEP based on: Other Objective/Functional Measures:    Pt with clicking during forward flexion modified with theraball for flexion seated. Pt with TTP at iliac crest, posterior hip. Post Treatment Pain Level (on 0 to 10) scale:   0-1 / 10     ASSESSMENT    Assessment/Changes in Function:   Pt may benefit from course of dry needling.      []  See Progress Note/Recertification   Patient will continue to benefit from skilled PT services to modify and progress therapeutic interventions, address functional mobility deficits, address ROM deficits, address strength deficits, analyze and address soft tissue restrictions, analyze and cue movement patterns and analyze and modify body mechanics/ergonomics to attain remaining goals. to attain remaining goals. Progress toward goals / Updated goals:    Pt would benefit from TDN to improve function.      PLAN    []  Upgrade activities as tolerated YES Continue plan of care   []  Discharge due to :    [x]  Other: Will be treated with TDN pending MD approval.     Therapist: Kathy Finley PT    Date: 3/6/2017 Time: 10:30 AM     Future Appointments  Date Time Provider Lana Sharma   3/15/2017 12:00 PM Niall Lin, PT REHAB CENTER AT [facility]

## 2017-03-10 ENCOUNTER — APPOINTMENT (OUTPATIENT)
Dept: PHYSICAL THERAPY | Age: 82
End: 2017-03-10
Payer: MEDICARE

## 2017-03-15 ENCOUNTER — HOSPITAL ENCOUNTER (OUTPATIENT)
Dept: PHYSICAL THERAPY | Age: 82
Discharge: HOME | End: 2017-03-15
Payer: MEDICARE

## 2017-03-15 PROCEDURE — 97140 MANUAL THERAPY 1/> REGIONS: CPT

## 2017-03-15 PROCEDURE — 97110 THERAPEUTIC EXERCISES: CPT

## 2017-03-15 NOTE — PROGRESS NOTES
PHYSICAL THERAPY - DAILY TREATMENT NOTE    Patient Name: Ni France        Date: 3/15/2017  : 1926   YES Patient  Verified  Visit #:     Insurance: Payor: Lelo Hebert / Plan: [place] MED[place]ARE PART A & B / Product Type: Medicare /      In time:  Out time: 400   Total Treatment Time: 45     Medicare Time Tracking (below)   Total Timed Codes (min):  45 1:1 Treatment Time:  25     TREATMENT AREA =  Low back pain [M54.5]    SUBJECTIVE  Pain Level (on 0 to 10 scale):  0  / 10   Medication Changes/New allergies or changes in medical history, any new surgeries or procedures? NO    If yes, update Summary List   Subjective Functional Status/Changes:  []  No changes reported     Functional improvements: \"feeling pretty good\"  Functional impairments: LBP that \"shoots across\" when getting back in bed in the middle of the night        OBJECTIVE    15 min Manual Therapy: Technique:      [] S/DTM []IASTM []PROM [] Passive Stretching   []manual TPR  [x] TDN (see objective)  []Jt manipulation:Gr I [] II []  III [] IV[] V[]  Treatment/Area:  R hip/lumbar   Rationale:      decrease pain, increase ROM, increase tissue extensibility and decrease trigger points to improve patient's ability to ambulate, get in and out of bed    30, bill 10 min Therapeutic Exercise:  [x]  See flow sheet   Rationale:      increase ROM and increase strength to improve the patients ability to ambulate, get in and out of bed      min Patient Education:  YES  Reviewed HEP   []  Progressed/Changed HEP based on: Other Objective/Functional Measures: Other Objective/Functional Measures:    Dry Needling Procedure Note    Dry Needle Session Number:  1    Procedure: An intramuscular manual therapy (dry needling) and a neuro-muscular re-education treatment was done to deactivate myofascial trigger points, with a 15/30 gauge solid filament needle, under aseptic technique.     Indication(s): [] Muscle spasms [] Myalgia/Myositis  [] Muscle cramps      [] Muscle imbalances [] TMD (TMJ) [x] Myofascial pain & dysfunction     [] Other: _muscle stiffness_    TIMEOUT PERFORMED:  1210 (enter time the timeout was completed)   Nuha Sibley (enter who was present)    Informed Consent Obtained: [x] Verbal  [x] Written (obtained at first needling session)  The following items were reviewed with the patient:   Purpose of dry needling, side effects, possible complications, and the informed consent    The need to report the use of blood thinners and/or immunosuppressant medications    How to respond to possible adverse effects of the treatment   Self treatment of post needling soreness: heat (moist heat, heat wraps) and stretching   Opportunity was given to ask any questions, all questions were answered    Treatment:  The following muscles were treated today:    Right: Glute max, obliques   Left: NA     Patients response to todays treatment:   [x]  LTRs  [x]  Muscle Relaxation  [x]  Pain Relief  []  Decreased Tinnitus  []  Decreased HAs [x]  Post needling soreness []  Increased ROM   []  Other:           Post Treatment Pain Level (on 0 to 10) scale:   0  / 10     ASSESSMENT  Assessment/Changes in Function:     Good activity tolerance     []  See Progress Note/Recertification   Patient will continue to benefit from skilled PT services to modify and progress therapeutic interventions to attain remaining goals. Progress toward goals / Updated goals:    1. The patient will be independent in HEP in preparation for discharge. 2. Improve functional ability as evidenced by a score improvement of > or = 6 points on FOTO. 3. The patient will be able ot stand up from soft surface at < or = 18\" seat height > or = 5x without UE assistance.    4. The patient will be able to walk > or = 1 block without symptoms for ambulating for exercise program.      PLAN  [x]  Upgrade activities as tolerated YES Continue plan of care   []  Discharge due to :    []  Other: Therapist: Kath Mata, PT, DPT, MTC, CMTPT    Date: 3/15/2017 Time: 12:28 PM     No future appointments.

## 2017-03-24 ENCOUNTER — APPOINTMENT (OUTPATIENT)
Dept: PHYSICAL THERAPY | Age: 82
End: 2017-03-24
Payer: MEDICARE

## 2017-04-05 ENCOUNTER — HOSPITAL ENCOUNTER (OUTPATIENT)
Dept: PHYSICAL THERAPY | Age: 82
Discharge: HOME | End: 2017-04-05
Payer: MEDICARE

## 2017-04-05 PROCEDURE — 97140 MANUAL THERAPY 1/> REGIONS: CPT

## 2017-04-05 PROCEDURE — G8979 MOBILITY GOAL STATUS: HCPCS

## 2017-04-05 PROCEDURE — G8980 MOBILITY D/C STATUS: HCPCS

## 2017-04-05 NOTE — PROGRESS NOTES
[address] PHYSICAL THERAPY AT [facility] [address] [place], [address]  Phone: [phone]  Fax: [phone] SUMMARY      Patient Name: Lorna Valencia : 1926   Treatment/Medical Diagnosis: Low back pain [M54.5]   Onset Date: Chronic LBP, knee pain 3 mos. ago    Referral Source: [facility]): 17   Prior Hospitalization: See Medical History Provider #: [id number]   Prior Level of Function: Tolerable LBP   Comorbidities: OP, arthritis, pacemaker, alcohol use, HTN, CA   Medications: Verified on Patient Summary List   Visits from [facility]: 12 Missed Visits: 0     GOALS:  1. The patient will be independent in HEP in preparation for discharge. 2. Improve functional ability as evidenced by a score improvement of > or = 6 points on FOTO. 3. The patient will be able ot stand up from soft surface at < or = 18\" seat height > or = 5x without UE assistance. 4. The patient will be able to walk > or = 1 block without symptoms for ambulating for exercise program.    Patient is independent in HEP  FOTO= not formally reassessed  Patient is able to perform sit to stands 2x10 from 18\" seat height without UE assistance  Walking tolerance limited to 1/3 of a block due to pain     Key Functional Changes/Progress: The patient has made progress in reqards to LE strength and endurance for functional mobility, but reports only minimal change in LBP/R hip pain symptoms. Frequency / Duration:   Patient to be seen   1-2   times per week for   4    weeks:  G-Codes (GP): Mobility:   Goal  CJ= 20-39%  D/C  CJ= 20-39%. The severity rating is based on the Other professional judgment. Assessments/Recommendations: DC at this time due to plateau in progress and patient independence in HEP. If you have any questions/comments please contact us directly at [phone].    Thank you for allowing us to assist in the care of your patient. Therapist Signature: Luise Dance, PT, DPT, MTC, CMTPT Date: 4/1/3336   Certification Period:  Reporting Period: 3/1/17 to 5/30/17 2/1/17 to 4/5/17 Time: 11:11 AM   NOTE TO PHYSICIAN:  [address] AND FAX TO   [facility] [place] [facility]: [phone]. If you are unable to process this request in 24 hours please contact our office: [phone].    ___ I have read the above report and request that my patient continue as recommended.   ___ I have read the above report and request that my patient continue therapy with the following changes/special instructions: ________________________________________________   ___ I have read the above report and request that my patient be discharged from therapy.      Physician Signature:        Date:       Time:

## 2017-04-05 NOTE — PROGRESS NOTES
PHYSICAL THERAPY - DAILY TREATMENT NOTE    Patient Name: Ni France        Date: 2017  : 1926   YES Patient  Verified  Visit #:     Insurance: Payor: Eunice Camel / Plan: [place] MEDICARE PART A & B / Product Type: Medicare /      In time: 7940 Out time: 1140   Total Treatment Time: 70     Medicare Time Tracking (below)   Total Timed Codes (min):  70 1:1 Treatment Time:  30     TREATMENT AREA =  Low back pain [M54.5]    SUBJECTIVE  Pain Level (on 0 to 10 scale):  0  / 10   Medication Changes/New allergies or changes in medical history, any new surgeries or procedures? NO    If yes, update Summary List   Subjective Functional Status/Changes:  []  No changes reported     Functional improvements: 24 hour improvement post-needling, but no more  Functional impairments: intermittent LBP when getting in and out of bed       OBJECTIVE    25 min Manual Therapy: Technique:      [] S/DTM []IASTM []PROM [x] Passive Stretching   [x]manual TPR  [] TDN (see objective)  []Jt manipulation:Gr I [] II []  III [] IV[] V[]  Treatment/Area:  R hip and lumbar   Rationale:      decrease pain, increase ROM, increase tissue extensibility and decrease trigger points to improve patient's ability to ambulate, get in and out of bed without pain    5 1:1, 40 total min Therapeutic Exercise:  [x]  See flow sheet   Rationale:      increase ROM and increase strength to improve the patients ability to ambulate, get in and out of bed without pain      min Patient Education:  YES  Reviewed HEP   []  Progressed/Changed HEP based on:         Other Objective/Functional Measures:    Patient able to perform sit to stand from 18\" surface 2x10 times without UE assistance  UA to walk >1/3 of a block without pain     Post Treatment Pain Level (on 0 to 10) scale:   0  / 10     ASSESSMENT  Assessment/Changes in Function:     Patient still has limited walking tolerance     []  See Progress Note/Recertification   Patient will continue to benefit from skilled PT services to modify and progress therapeutic interventions to attain remaining goals. Progress toward goals / Updated goals:    1. The patient will be independent in HEP in preparation for discharge. 2. Improve functional ability as evidenced by a score improvement of > or = 6 points on FOTO. 3. The patient will be able ot stand up from soft surface at < or = 18\" seat height > or = 5x without UE assistance. 4. The patient will be able to walk > or = 1 block without symptoms for ambulating for exercise program.        PLAN  []  Upgrade activities as tolerated NO Continue plan of care   [x]  Discharge due to : Corby Weinstein in progress, independence in HEP   []  Other:      Therapist: Christina Zendejas, PT, DPT, MTC, CMTPT    Date: 4/5/2017 Time: 10:34 AM     No future appointments.

## 2020-11-23 ENCOUNTER — HOSPITAL ENCOUNTER (OUTPATIENT)
Dept: PHYSICAL THERAPY | Age: 85
Discharge: HOME | End: 2020-11-23
Payer: MEDICARE

## 2020-11-23 PROCEDURE — 97162 PT EVAL MOD COMPLEX 30 MIN: CPT

## 2020-11-23 PROCEDURE — 97110 THERAPEUTIC EXERCISES: CPT

## 2020-11-23 NOTE — PROGRESS NOTES
[address] [facility] PHYSICAL THERAPY AT [address] [facility], [address] [place] [address], [address], [address] [place] [address] Ln - Phone: [phone]  Fax: [phone] / [address] [facility]  Patient Name: Ni Michel : 1926   Medical   Diagnosis: Other abnormalities of gait and mobility [R26.89] Treatment Diagnosis: Gait and mobility dysfunction   Onset Date: 2020     Referral Source: Catalina Chilel MD [facility]): 2020   Prior Hospitalization: See medical history Provider #: [id number]   Prior Level of Function: Lives alone, ind in ADLs, used cane on and off as needed   Comorbidities: Arthritis, pacemaker, high blood pressure   Medications: Verified on Patient Summary List   The Plan of Care and following information is based on the information from the initial evaluation.   ===========================================================================================  Assessment / key information:  Pt is a 80year old woman who presented to the clinic today due to decrease in gait and mobility function in her home and out in the community. Pt lives alone and is fully independent, however, is starting to have trouble navigating stairs, curbs, and all other threshold differences in elevation. She has used a cane on and off for years, however, reports needing it 24/ for the last 4 months which is new. She presents with decreased ROM and strength throughout her BLEs, poor posture, poor static and dynamic standing balance, gait abnormalities, and decreased mobility with transfers and ADLs. Pt would benefit from skilled PT to address these deficits in order to increase safety within the home and community, perform ADLs safely, and to retain independence in her home.    FOTO = 49  ===========================================================================================  Eval Complexity: History HIGH Complexity :3+ comorbidities / personal factors will impact the outcome/ POC ;  Examination  HIGH Complexity : 4+ Standardized tests and measures addressing body structure, function, activity limitation and / or participation in recreation ; Presentation MEDIUM Complexity : Evolving with changing characteristics ; Decision Making MEDIUM Complexity : FOTO score of 26-74; Overall Complexity MEDIUM  Problem List: decrease ROM, decrease strength, impaired gait/ balance, decrease ADL/ functional abilitiies, decrease activity tolerance, decrease flexibility/ joint mobility and decrease transfer abilities   Treatment Plan may include any combination of the following: Therapeutic exercise, Therapeutic activities, Neuromuscular re-education, Physical agent/modality, Gait/balance training, Manual therapy, Patient education, Functional mobility training, Home safety training and Stair training  Patient / Family readiness to learn indicated by: asking questions, trying to perform skills and interest  Persons(s) to be included in education: patient (P)  Barriers to Learning/Limitations: None  Measures taken:    Patient Goal (s): Be able to go up and down stairs safely in her home and out in the community, feel steadier on her feet   Patient self reported health status: good  Rehabilitation Potential: good   Short Term Goals: To be accomplished in  6  weeks:  1. Demonstrate 100% independence and compliance with progressive HEP to facilitate recovery. 2. Demonstrate SLS for at least 2 seconds with MOE support to reduce risk for falls on stairs in home. 3. Will improve TUG by 5% in order to show improvement towards lowering falls risk.  Long Term Goals: To be accomplished in  12  weeks:  1. Increase FOTO score to 60 to demonstrate improved function. 2. Demonstrate SLS for at least 2 seconds without UE support to reduce risk for falls on stairs in community. 3. Demonstrate 100% independence with HEP in preparation for discharge.    4. Demonstrate sit to stand transfer with MOE or without UE support to show increased strength and balance to reduce risk for falls. Frequency / Duration:   Patient to be seen  2-3  times per week for 10-12  weeks:  Patient / Caregiver education and instruction: activity modification and exercises    Therapist Signature: Kristen Petersen PT Date: 76/70/1939   Certification Period: 2/20/2021 Time: 12:06 PM   ===========================================================================================  I certify that the above Physical Therapy Services are being furnished while the patient is under my care. I agree with the treatment plan and certify that this therapy is necessary. Physician Signature:        Date:       Time:     Please sign and return to [facility] or you may fax the signed copy to [phone]. Thank you.

## 2020-11-23 NOTE — PROGRESS NOTES
PHYSICAL THERAPY - EVAL AND DAILY TREATMENT NOTE    Patient Name: Ni France        Date: 2020  : 1926   YES Patient  Verified  Visit #:   1   of   1  Insurance: Payor: VA MEDICARE / Plan: VA MEDICARE PART A & B / Product Type: Medicare /      In time: 10:30 Out time: 11:30   Total Treatment Time: 60     Medicare Time Tracking (below)   Total Timed Codes (min):  15 1:1 Treatment Time:  60     TREATMENT AREA/ DIAGNOSIS = Gait and mobility dysfunction    SUBJECTIVE    Pain Level (on 0 to 10 scale): No pain, here for gait and mobility issues       Medication Changes/New allergies or changes in medical history, any new surgeries or procedures?     NO    If yes, update Summary List   Subjective Functional Status/Changes:  []  No changes reported   CC: balance issues, thresholds with height elevation and stairs are difficult, cannot get up and over those barriers, has been ongoing and worsening over last 6 months  PMHx: arthritis, pacemaker, high blood pressure  Fall History: denies any falls  Prior Level of Function: ambulates with cane, has had cane for a long time switching sides as necessary, has only used it for 4 months consistently now, uses cane in household, always wears shoes in the house due to arch collapse and foot pain without  Current Functional Deficits: difficulty walking on uneven surfaces and navigating stairs/curbs, difficulty balancing without cane occasionally  Social/Recreational/Work: read, needle point, socialize with friends (when able to without Covid)   Living situation: lives alone in [address] [facility], Count includes the [facility] going up the stairs, 2 steps to get into front of house without handrail, 3 steps in and out of the garage LHR, 3 patio steps no HR  Equipment: cane only, no walker or shower rails, has alarm next to shower in case she falls  Patient goal:  Navigate stairs, walk in house safely         OBJECTIVE  Physical Therapy Evaluation  Neurologic    Posture: [x] Poor    [] Fair    [] Good    Describe:    Increased kyphosis and trunk flexion, rounded shoulders in sitting and standing, increased knee valgus and knee flexion in standing    Gait: [] Normal    [x] Abnormal    Device:   Cane  Describe: ambulates with cane in L hand, short shuffled steps, increased trunk flexion, increased knee valgus and knee flexion during stance, lacks knee extension in gait entirely, decreased nichole/step length/speed    ROM:                                      AROM     Knee Left Right   Ext wfl wfl   Flex wfl wfl             AROM                             Hip Left Right   Flex wfl wfl   Ext decreased decreased   ABD wfl wfl                                            AROM       Ankle Left Right   dorsiflex decreased decreased   plantarflex wfl wfl     Strength (MMT):                              Hip L (1-5) R (1-5)   Hip Flexion 3+ 3+   Hip Ext 3+ 3+   Hip ABD 3+ 3+   Hip ADD 3+ 3+     Knee L (1-5) R (1-5)   Knee Flexion 3+ 3+   Knee Extension 3+ 3+   Ankle PF 3 3   Ankle DF 3 3       Sensation: wnl BLE, did not test bottom of feet, pt reports normal sensation on bottom of feet but always wears shoes in her home (except to shower) due to arch issues    Balance/ Equilibrium:              Left            Right  Tracks Across Midline wfl  wfl   Reaches Across Midline decreased decreased         Sitting Balance: Static:   [x] Good    [] Fair    [] Poor     Dynamic:   [] Good    [x] Fair    [] Poor        Standing Balance: Static:   [] Good    [x] Fair    [] Poor     Dynamic:   [] Good    [] Fair    [x] Poor        Sit to stand 30 seconds - 6times        Rhomberg   Eyes Open  Eyes Closed    30sec  30sec with apprehension          Tandem   Eyes Open  Eyes Closed   L  MOE support 1-2 sec L UA   R MOE support 1-2 sec R UA             Single Leg Stance:         Eyes Open  Eyes Closed   L UA L Not tested   R UA R Not tested         TUG test: 23 seconds    Functional Mobility      Bed Mobility:  Reports no issues Transfers:       Sit-Stand: BUE, increased effort, knees collapse in upon effort to stand      Curbs: does not try, will use ramps instead of stepping up      Stairs: 4steps, uses LHR and cane, step to pattern, up with L, down with R      Other:       Impaired Judgement: [] Y    [x] N      Impaired Vision:  [] Y    [x] N      Safety Awareness Deficits  [x] Y    [] N      Impaired Hearing  [] Y    [x] N      Able to Express Needs [x] Y    [] N        OBJECTIVE TREATMENT:     15 min Therapeutic Exercise:  [x]  See flow sheet   Rationale:      increase ROM and increase strength to improve the patients ability to  [x] perform ADLs   [x] ambulate  [] perform work  [] relaxation/ sleep           throughout min Patient Education:  Yes    [x] Reviewed HEP   []  Progressed/Changed HEP based on: Other Objective/Functional Measures:     Post Treatment Pain Level (on 0 to 10) scale:   0  / 10     ASSESSMENT  [x]  See Progress Note/Recertification      Patient will continue to benefit from skilled PT services to modify and progress therapeutic interventions, address functional mobility deficits, address ROM deficits, address strength deficits and assess and modify postural abnormalities to attain remaining goals. Progress toward goals / Updated goals:    See POC      PLAN  [x]  Upgrade activities as tolerated YES Continue plan of care   []  Discharge due to :    []  Other:      Therapist: Kristen Petersen PT    Date: 83/91/6352 Time: 10:31 AM        No future appointments.

## 2020-12-04 ENCOUNTER — HOSPITAL ENCOUNTER (OUTPATIENT)
Dept: PHYSICAL THERAPY | Age: 85
Discharge: HOME | End: 2020-12-04
Payer: MEDICARE

## 2020-12-04 PROCEDURE — 97110 THERAPEUTIC EXERCISES: CPT

## 2020-12-04 PROCEDURE — 97112 NEUROMUSCULAR REEDUCATION: CPT

## 2020-12-04 NOTE — PROGRESS NOTES
PHYSICAL THERAPY - DAILY TREATMENT NOTE     Patient Name: Ni France        Date: 2020  : 1926   YES Patient  Verified  Visit #:   2   of   8  Insurance: Payor: Kerrie Milagro / Plan: VA MEDICARE PART A & B / Product Type: Medicare /      In time: 1369 Out time:    Total Treatment Time: 40     Medicare/BCBS Time Tracking (below)   Total Timed Codes (min):  40 1:1 Treatment Time:  40     TREATMENT AREA =  Other abnormalities of gait and mobility [R26.89]    SUBJECTIVE    Pain Level (on 0 to 10 scale):  0  / 10   Medication Changes/New allergies or changes in medical history, any new surgeries or procedures? NO    If yes, update Summary List   Subjective Functional Status/Changes:  []  No changes reported       Functional improvements: Pt reports she has been doing her exercises. Some progress in stability noted with exercises at home. Functional impairments: Balance with amb and ADLs impaired. OBJECTIVE      15 min Therapeutic Exercise:  [x]  See flow sheet   Rationale:      increase ROM and increase strength to improve the patients ability to perform ADLs with less instability, fatigue and improved safety     25 min Neuromuscular Re-ed: [x]  See flow sheet   Rationale:      improve coordination, improve balance and increase proprioception to improve the patients ability to perform amb with increased safety. Billed With/As:   [x] TE   [] TA   [x] Neuro   [] Self Care Patient Education: [x] Review HEP    [] Progressed/Changed HEP based on:   [] positioning   [] body mechanics   [] transfers   [] heat/ice application    [] other:        Other Objective/Functional Measures:  Reviewed HEP and initiated there ex, static and dynamic balance. Post Treatment Pain Level (on 0 to 10) scale:   0  / 10     ASSESSMENT    Assessment/Changes in Function:     Pt challenged with hip flexion/marches on L with R LE stance time. Pt with LOB x 2 with dynamic balance, min to mod A to recover.    [] See Progress Note/Recertification   Patient will continue to benefit from skilled PT services to modify and progress therapeutic interventions, address functional mobility deficits, address ROM deficits, address strength deficits and analyze and address soft tissue restrictions to attain remaining goals. Progress toward goals / Updated goals:    Pt with good progress toward recently established goals.      PLAN    [x]  Upgrade activities as tolerated YES Continue plan of care   []  Discharge due to :    []  Other:      Therapist: Franc Gillis PT    Date: 12/4/2020 Time: 10:12 AM     Future Appointments   Date Time Provider Lana Sharma   12/4/2020 11:00 AM Ami Randhawa, PT [facility]   12/9/2020  1:00 PM Deion Pereira PTA [facility]   12/11/2020 10:30 AM Deion Pereira PTA [facility]   12/15/2020 11:00 AM Deion Pereira PTA [facility]   12/18/2020 11:45 AM Ami Randhawa, PT [facility]

## 2020-12-09 ENCOUNTER — HOSPITAL ENCOUNTER (OUTPATIENT)
Dept: PHYSICAL THERAPY | Age: 85
Discharge: HOME | End: 2020-12-09
Payer: MEDICARE

## 2020-12-09 PROCEDURE — 97110 THERAPEUTIC EXERCISES: CPT

## 2020-12-09 PROCEDURE — 97112 NEUROMUSCULAR REEDUCATION: CPT

## 2020-12-09 PROCEDURE — 97116 GAIT TRAINING THERAPY: CPT

## 2020-12-09 NOTE — PROGRESS NOTES
PHYSICAL THERAPY - DAILY TREATMENT NOTE      Patient Name: Alec Jean        Date: 2020  : 1926   YES Patient  Verified  Visit #:   3   of   12  Insurance: Payor: [place] / Plan: [place] MED[place]ARE PART A & B / Product Type: Medicare /      In time: 1:05 Out time: 2:05   Total Treatment Time: 60     Medicare/BCBS Time Tracking (below)   Total Timed Codes (min):  60 1:1 Treatment Time:  60     TREATMENT AREA = Other abnormalities of gait and mobility [R26.89]    SUBJECTIVE    Pain Level (on 0 to 10 scale):  0  / 10   Medication Changes/New allergies or changes in medical history, any new surgeries or procedures? NO    If yes, update Summary List   Subjective Functional Status/Changes:  []  No changes reported     Functional improvements: Pt reports she has been doing her exercises. Some progress in stability noted with exercises at home. Functional impairments: Balance with amb and ADLs impaired. OBJECTIVE        15 min Therapeutic Exercise:  [x]  See flow sheet   Rationale:    Rationale:  increase strength to improve the patients ability to    [x] perform ADLs   [x] ambulate  [] perform work  [] relaxation/ sleep         30 min Neuromuscular Re-ed: [x]  See flow sheet   Rationale:   Rationale:  improve coordination, improve balance and increase proprioception to improve the patients ability to    [x] perform ADLs   [x] ambulate  [] perform work  [] relaxation/ sleep         15 min Gait Training: In // bars with varying levels of UE support from none to BUE, lateral walks, backwards walk, high steps over low object   Rationale:  to improve ambulation safety and efficiency in order to improve patient's ability to safely ambulate at home for self care.     Billed With/As:   [x] TE   [] TA   [x] Neuro   [] Self Care Patient Education: [x] Review HEP    [] Progressed/Changed HEP based on:   [] positioning   [x] body mechanics   [x] transfers   [] heat/ice application    [] other: throughout min Patient Education:  YES  Reviewed HEP   []  Progressed/Changed HEP based on: Other Objective/Functional Measures:    Reviewed expectation for progression of exercises, pt was willing to trial varying levels of UE support in the parallel bars with increased confidence this session compared to IE when she was very apprehensive     Post Treatment Pain Level (on 0 to 10) scale:   0  / 10     ASSESSMENT    Assessment/Changes in Function:     Pt has difficulty with SLS stance activities, especially on her RLE, which cause R low back pain with repeated attempts. She is unable to perform SLS or marches without significant compensations (hip drop, LE bracing, BUE support) and demonstrates significant glute weakness. No LOB today with any exercises, would tolerate balance progression next visit. []  See Progress Note/Recertification   Patient will continue to benefit from skilled PT services to modify and progress therapeutic interventions, address functional mobility deficits, address ROM deficits, address strength deficits, analyze and cue movement patterns, assess and modify postural abnormalities and address imbalance/dizziness to attain remaining goals. to attain remaining goals.    Progress toward goals / Updated goals:    [] decr pain   [x]inc stability   [x]inc balance [x] inc ROM    [x] inc strength  [] centralizing symptoms                    [] progressing  Function  [] progressing towards LTGs            []  progressing HEP     PLAN    [x]  Upgrade activities as tolerated YES Continue plan of care   []  Discharge due to :    []  Other:      Therapist: Ajit Carpenter PT    Date: 12/1/9578 Time: 1:29 PM     Future Appointments   Date Time Provider Lana Sharma   12/11/2020 10:30 AM Cody Madden PTA [facility]   12/15/2020 11:00 AM Cody Madden PTA [facility]   12/18/2020 11:45 AM Micaela Peter PT [facility]

## 2020-12-11 ENCOUNTER — HOSPITAL ENCOUNTER (OUTPATIENT)
Dept: PHYSICAL THERAPY | Age: 85
Discharge: HOME | End: 2020-12-11
Payer: MEDICARE

## 2020-12-11 PROCEDURE — 97112 NEUROMUSCULAR REEDUCATION: CPT

## 2020-12-11 PROCEDURE — 97116 GAIT TRAINING THERAPY: CPT

## 2020-12-11 PROCEDURE — 97110 THERAPEUTIC EXERCISES: CPT

## 2020-12-11 NOTE — PROGRESS NOTES
PHYSICAL THERAPY - DAILY TREATMENT NOTE      Patient Name: Ni Foster        Date: 2020  : 1926   YES Patient  Verified  Visit #:     Insurance: Payor: Zulema Ruiz / Plan: [place] MEDICARE PART A & B / Product Type: Medicare /      In time: 10:30 Out time: 11:30   Total Treatment Time: 60     Medicare/BCBS Time Tracking (below)   Total Timed Codes (min):  60 1:1 Treatment Time:  60     TREATMENT AREA = Other abnormalities of gait and mobility [R26.89]    SUBJECTIVE    Pain Level (on 0 to 10 scale):  0  / 10   Medication Changes/New allergies or changes in medical history, any new surgeries or procedures? NO    If yes, update Summary List   Subjective Functional Status/Changes:  []  No changes reported   \"I'm so sore from Wednesday, but it doesn't hurt and it already feels better than yesterday. \"   Functional improvement doing better at performing HEP at home   Functional limitation ADLs, stairs        OBJECTIVE        15 min Therapeutic Exercise:  [x]  See flow sheet   Rationale:    Rationale:  increase ROM and increase strength to improve the patients ability to    [x] perform ADLs   [x] ambulate  [x] perform work  [] relaxation/ sleep           15 min Neuromuscular Re-ed: [x]  See flow sheet   Rationale:   Rationale:  improve coordination and improve balance to improve the patients ability to    [x] perform ADLs   [x] ambulate  [] perform work  [] relaxation/ sleep         30 min Gait Training: In // bars, worked on foot clearance over objects, backwards/later walks, varying levels of support as needed   Rationale:  to improve ambulation safety and efficiency in order to improve patient's ability to safely ambulate at home for self care.     Billed With/As:   [x] TE   [x] Gait   [x] Neuro   [] Self Care Patient Education: [x] Review HEP    [x] Progressed/Changed HEP based on: added sit<>stand 5x   [] positioning   [] body mechanics   [] transfers   [] heat/ice application    [] other:        throughout min Patient Education:  YES  Reviewed HEP   []  Progressed/Changed HEP based on: Other Objective/Functional Measures:    Reviewed expectation for progression of exercises, pt was willing to trial varying levels of UE support in the parallel bars with increased confidence this session compared to IE when she was very apprehensive     Post Treatment Pain Level (on 0 to 10) scale:   0  / 10     ASSESSMENT    Assessment/Changes in Function:     Requires mod and repetitive verbal and physical cuing for proper form during there-ex due to compensations for weak hi and LEs. Able in to decrease UE support and increase foot clearance during gait training exercises. []  See Progress Note/Recertification   Patient will continue to benefit from skilled PT services to modify and progress therapeutic interventions, address functional mobility deficits, address ROM deficits, address strength deficits, analyze and cue movement patterns, analyze and modify body mechanics/ergonomics, assess and modify postural abnormalities and address imbalance/dizziness to attain remaining goals. to attain remaining goals.    Progress toward goals / Updated goals:    [] decr pain   [x]inc stability   [x]inc balance [x] inc ROM    [x] inc strength  [] centralizing symptoms                    [] progressing  Function  [] progressing towards LTGs            []  progressing HEP     PLAN    [x]  Upgrade activities as tolerated YES Continue plan of care   []  Discharge due to :    []  Other:      Therapist: Clayton Gillis PT    Date: 80/68/4230 Time: 10:40 AM     Future Appointments   Date Time Provider Lana Sharma   12/15/2020 11:00 AM Doug Rosado PTA [facility]   12/18/2020 11:45 AM Fuad Mckeon PT [facility]

## 2020-12-15 ENCOUNTER — HOSPITAL ENCOUNTER (OUTPATIENT)
Dept: PHYSICAL THERAPY | Age: 85
Discharge: HOME | End: 2020-12-15
Payer: MEDICARE

## 2020-12-15 PROCEDURE — 97112 NEUROMUSCULAR REEDUCATION: CPT

## 2020-12-15 PROCEDURE — 97110 THERAPEUTIC EXERCISES: CPT

## 2020-12-15 PROCEDURE — 97116 GAIT TRAINING THERAPY: CPT

## 2020-12-18 ENCOUNTER — HOSPITAL ENCOUNTER (OUTPATIENT)
Dept: PHYSICAL THERAPY | Age: 85
Discharge: HOME | End: 2020-12-18
Payer: MEDICARE

## 2020-12-18 PROCEDURE — 97112 NEUROMUSCULAR REEDUCATION: CPT

## 2020-12-18 PROCEDURE — 97110 THERAPEUTIC EXERCISES: CPT

## 2020-12-18 NOTE — PROGRESS NOTES
PHYSICAL THERAPY - DAILY TREATMENT NOTE     Patient Name: Ni France        Date: 2020  : 1926   YES Patient  Verified  Visit #:   6  of   8  Insurance: Payor: Gila [place] / Plan: [place] MEDICARE PART A & B / Product Type: Medicare /      In time: 2 Out time: 3440   Total Treatment Time: 40     Medicare/BCBS Time Tracking (below)   Total Timed Codes (min):  40 1:1 Treatment Time:  40     TREATMENT AREA =  Other abnormalities of gait and mobility [R26.89]    SUBJECTIVE    Pain Level (on 0 to 10 scale):  0  / 10   Medication Changes/New allergies or changes in medical history, any new surgeries or procedures? NO    If yes, update Summary List   Subjective Functional Status/Changes:  []  No changes reported       Functional improvements: See PN/Re-certification. Functional impairments: Balance with amb and ADLs impaired. See PN/Re-certification. OBJECTIVE      15 min Therapeutic Exercise:  [x]  See flow sheet   Rationale:      increase ROM and increase strength to improve the patients ability to perform ADLs with improved safety     25 min Neuromuscular Re-ed: [x]  See flow sheet   Rationale:      improve coordination, improve balance and increase proprioception to improve the patients ability to perform amb with increased safety. Billed With/As:   [x] TE   [] TA   [x] Neuro   [] Self Care Patient Education: [x] Review HEP    [] Progressed/Changed HEP based on:   [] positioning   [] body mechanics   [] transfers   [] heat/ice application    [] other:        Other Objective/Functional Measures:  See PN/Re-certification. Post Treatment Pain Level (on 0 to 10) scale:   0  / 10     ASSESSMENT    Assessment/Changes in Function:     See PN/Re-certification.     []  See Progress Note/Recertification   Patient will continue to benefit from skilled PT services to modify and progress therapeutic interventions, address functional mobility deficits, address ROM deficits, address strength deficits and analyze and address soft tissue restrictions to attain remaining goals. Progress toward goals / Updated goals:    See PN/Re-certification.       PLAN    [x]  Upgrade activities as tolerated YES Continue plan of care   []  Discharge due to :    []  Other:      Therapist: Rafi Zelaya PT    Date: 12/18/2020 Time: 11:55 AM     Future Appointments   Date Time Provider Lana Sharma   12/18/2020 11:45 AM Rafael Juan PT [facility] [address]

## 2020-12-18 NOTE — PROGRESS NOTES
[address] [facility] PHYSICAL THERAPY AT [address] [facility], [address] [place] [address], [address] [facility], [address] [place] [address] - Phone: [phone]  Fax: [phone]          Patient Name: Ni Claudio : 1926   Treatment/Medical Diagnosis: Other abnormalities of gait and mobility [R26.89]   Onset Date: 2020    Referral Source: Nelson Jaramillo MD [facility]): 2020   Prior Hospitalization: See Medical History Provider #: [id number]   Comorbidities Arthritis, pacemaker, high blood pressure   Prior Level of Function: Lives alone, ind in ADLs, used cane on and off as needed   Medications: Verified on Patient Summary List   Visits from [facility]: 6 Missed Visits: 0     Previous Goals:  1. Demonstrate 100% independence and compliance with progressive HEP to facilitate recovery. 2. Demonstrate SLS for at least 2 seconds with MOE support to reduce risk for falls on stairs in home.       Prior Level/Current Level:  1) Prior Level: n/a   Current Level: 50% I    Goal Met? no  2) Prior Level: unable   Current Level: 4 sec with Single UE   Goal Met? yes      Key Functional Changes/Progress:  Pt demonstrating fair progress with ability to perform SLS to 4 sec B with single UE. Pt able to perform ROM EC x 15 sec with single UE support. She is able to perform standing hip abd x 10 reps B but requires B UE support secondary to gluteal strength deficits. She is 50% with current HEP including strength and balance exercises to improve I and safety.     Problem List: pain affecting function, decrease ROM, decrease strength, impaired gait/ balance, decrease ADL/ functional abilitiies, decrease activity tolerance, decrease flexibility/ joint mobility and decrease transfer abilities   Treatment Plan may include any combination of the following: Therapeutic exercise, Therapeutic activities, Neuromuscular re-education, Physical agent/modality, Gait/balance training, Manual therapy, Patient education, Self Care training, Functional mobility training and Home safety training  Patient Goal(s) has been updated and includes: To be more stable with walking, improve balance    Goals for this certification period include and are to be achieved in  4  weeks:  1. Increase FOTO score to 60 to demonstrate improved function. 2. Demonstrate SLS for at least 2 seconds without UE support to reduce risk for falls on stairs in community. 3. Demonstrate 100% independence with HEP in preparation for discharge. 4. Demonstrate sit to stand transfer with MOE or without UE support to show increased strength and balance to reduce risk for falls.      Frequency / Duration:   Patient to be seen   2   times per week for   4    weeks:    Assessments/Recommendations: Pt has made fair progress to STG and LTGs, she is highly motivated to progress and would benefit from further PT improve strength for functional I as she lives I in home and to address safety with amb to reduce fall risk. If you have any questions/comments please contact us directly at [phone]. Thank you for allowing us to assist in the care of your patient. Therapist Signature: Alexandra Hernandez PT Date: 57/26/2127   Certification Period:  Reporting Period: 12/18/20-2/17/20  n/a Time: 12:07 PM   NOTE TO PHYSICIAN:  PLEASE COMPLETE THE ORDERS BELOW AND FAX TO   [facility] [facility] at [place]: [phone]. If you are unable to process this request in 24 hours please contact our office: [phone].    ___ I have read the above report and request that my patient continue as recommended.   ___ I have read the above report and request that my patient continue therapy with the following changes/special instructions: ________________________________________________   ___ I have read the above report and request that my patient be discharged from therapy.      Physician Signature:        Date:       Time:

## 2020-12-21 ENCOUNTER — HOSPITAL ENCOUNTER (OUTPATIENT)
Dept: PHYSICAL THERAPY | Age: 85
Discharge: HOME | End: 2020-12-21
Payer: MEDICARE

## 2020-12-21 PROCEDURE — 97110 THERAPEUTIC EXERCISES: CPT

## 2020-12-21 PROCEDURE — 97112 NEUROMUSCULAR REEDUCATION: CPT

## 2020-12-21 NOTE — PROGRESS NOTES
PHYSICAL THERAPY - DAILY TREATMENT NOTE     Patient Name: Ni France        Date: 2020  : 1926   YES Patient  Verified  Visit #:   7  of   8  Insurance: Payor: Richard Gordillo / Plan: VA MEDICARE PART A & B / Product Type: Medicare /      In time: 255 Out time: 340   Total Treatment Time: 45     Medicare/BCBS Time Tracking (below)   Total Timed Codes (min):  45 1:1 Treatment Time:  45     TREATMENT AREA =  Other abnormalities of gait and mobility [R26.89]    SUBJECTIVE    Pain Level (on 0 to 10 scale):  0  / 10   Medication Changes/New allergies or changes in medical history, any new surgeries or procedures? NO    If yes, update Summary List   Subjective Functional Status/Changes:  []  No changes reported       Functional improvements: Pt reports she has been doing her exercises, including new exercise with the band. Functional impairments: Balance with amb and ADLs impaired, pt reports LOB with amb. OBJECTIVE      15 min Therapeutic Exercise:  [x]  See flow sheet   Rationale:      increase ROM and increase strength to improve the patients ability to perform ADLs with less instability and improved safety     30 min Neuromuscular Re-ed: [x]  See flow sheet   Rationale:      improve coordination, improve balance and increase proprioception to improve the patients ability to perform amb with increased safety. Billed With/As:   [x] TE   [] TA   [x] Neuro   [] Self Care Patient Education: [x] Review HEP    [] Progressed/Changed HEP based on:   [] positioning   [] body mechanics   [] transfers   [] heat/ice application    [] other:        Other Objective/Functional Measures:  Reviewed HEP and D/W pt safety with transfers, amb. Post Treatment Pain Level (on 0 to 10) scale:   0  / 10     ASSESSMENT    Assessment/Changes in Function:     Pt with addition of seated hip and core stability exercise with good response, Challenged with lateral amb, hip abduction.      []  See Progress Note/Recertification   Patient will continue to benefit from skilled PT services to modify and progress therapeutic interventions, address functional mobility deficits, address ROM deficits, address strength deficits and analyze and address soft tissue restrictions to attain remaining goals. Progress toward goals / Updated goals:    Pt with good progress toward recently established goals.      PLAN    [x]  Upgrade activities as tolerated YES Continue plan of care   []  Discharge due to :    []  Other:      Therapist: Garth Escalera, PT    Date: 12/21/2020 Time: 3pm     Future Appointments   Date Time Provider Lana Sharma   12/21/2020  2:45 PM Monegasque Anasco, PT [facility]   12/30/2020  3:30 PM Denys Enrique, PT [facility]

## 2020-12-30 ENCOUNTER — HOSPITAL ENCOUNTER (OUTPATIENT)
Dept: PHYSICAL THERAPY | Age: 85
Discharge: HOME | End: 2020-12-30
Payer: MEDICARE

## 2020-12-30 PROCEDURE — 97112 NEUROMUSCULAR REEDUCATION: CPT

## 2020-12-30 PROCEDURE — 97110 THERAPEUTIC EXERCISES: CPT

## 2020-12-30 NOTE — PROGRESS NOTES
PHYSICAL THERAPY - DAILY TREATMENT NOTE      Patient Name: Ni Haile        Date: 2020  : 1926   YES Patient  Verified  Visit #:     Insurance: Payor: Larisa Ricks / Plan: [place] MEDICARE PART A & B / Product Type: Medicare /      In time: 3:35 Out time: 4:15   Total Treatment Time: 40     Medicare/BCBS Time Tracking (below)   Total Timed Codes (min):  40 1:1 Treatment Time:  40     TREATMENT AREA =  Other abnormalities of gait and mobility [R26.89]    SUBJECTIVE    Pain Level (on 0 to 10 scale):  0  / 10   Medication Changes/New allergies or changes in medical history, any new surgeries or procedures? NO    If yes, update Summary List   Subjective Functional Status/Changes:  []  No changes reported       Functional improvements: balance  Functional impairments: walking         OBJECTIVE    15 min Therapeutic Exercise:  [x]  See flow sheet   Rationale:      increase ROM and increase strength to improve the patients ability to walk        25 min Neuromuscular Re-ed: [x]? See flow sheet   Rationale:      improve coordination, improve balance and increase proprioception to improve the patients ability to walk     Billed With/As:   [] TE   [] TA   [] Neuro   [] Self Care Patient Education: [x] Review HEP    [] Progressed/Changed HEP based on:   [] positioning   [] body mechanics   [] transfers   [] heat/ice application    [] other:      Other Objective/Functional Measures:    Patient continues to report intermittent right lumbar pain with prolonged standing. Post Treatment Pain Level (on 0 to 10) scale:   0  / 10     ASSESSMENT    Assessment/Changes in Function:     Patient reports declining use of right foot AFO recommended by Dr. Ashu Leo.      []  See Progress Note/Recertification   Patient will continue to benefit from skilled PT services to modify and progress therapeutic interventions, address functional mobility deficits, address ROM deficits, address strength deficits, analyze and address soft tissue restrictions, analyze and cue movement patterns, analyze and modify body mechanics/ergonomics and assess and modify postural abnormalities to attain remaining goals. to attain remaining goals. Progress toward goals / Updated goals:    Good Progress to    [] STG    [x] LTG  4 as shown by sit to stand without UE assist.     PLAN    [x]  Upgrade activities as tolerated YES Continue plan of care   []  Discharge due to :    []  Other:      Therapist: Debby Roldan PT    Date: 12/30/2020 Time: 3:45 PM   No future appointments.

## 2021-01-04 ENCOUNTER — HOSPITAL ENCOUNTER (OUTPATIENT)
Dept: PHYSICAL THERAPY | Age: 86
Discharge: HOME | End: 2021-01-04
Payer: MEDICARE

## 2021-01-04 PROCEDURE — 97110 THERAPEUTIC EXERCISES: CPT

## 2021-01-04 PROCEDURE — 97112 NEUROMUSCULAR REEDUCATION: CPT

## 2021-01-04 NOTE — PROGRESS NOTES
PHYSICAL THERAPY - DAILY TREATMENT NOTE     Patient Name: Ni France        Date: 2021  : 1926   YES Patient  Verified  Visit #:     Insurance: Payor: Larisa Ricks / Plan: VA MEDICARE PART A & B / Product Type: Medicare /      In time: 8096 Out time: 1   Total Treatment Time: 45     Medicare/BCBS Time Tracking (below)   Total Timed Codes (min):  45 1:1 Treatment Time:  45     TREATMENT AREA =  Other abnormalities of gait and mobility [R26.89]    SUBJECTIVE    Pain Level (on 0 to 10 scale):  0  / 10   Medication Changes/New allergies or changes in medical history, any new surgeries or procedures? NO    If yes, update Summary List   Subjective Functional Status/Changes:  []  No changes reported     Functional improvements: Pt reports she is unable to walk without use of cane but noting slight improvement in amount of instability with cane. Functional impairments:  Balance deficits, difficulty with amb on uneven surfaces. OBJECTIVE      15 min Therapeutic Exercise:  [x]  See flow sheet   Rationale:      increase ROM and increase strength to improve the patients ability to perform ADLs with less instability and improved safety     30 min Neuromuscular Re-ed: [x]  See flow sheet   Rationale:      improve coordination, improve balance and increase proprioception to improve the patients ability to perform amb with increased safety. Billed With/As:   [x] TE   [] TA   [x] Neuro   [] Self Care Patient Education: [x] Review HEP    [] Progressed/Changed HEP based on:   [] positioning   [] body mechanics   [] transfers   [] heat/ice application    [] other:        Other Objective/Functional Measures:  Pt with progression to lateral step downs. Pt able to perform B UE support and 2 inch lat step down on L side only.     Post Treatment Pain Level (on 0 to 10) scale:   0  / 10     ASSESSMENT    Assessment/Changes in Function:   Pt with inability to perform lateral step down with R LE even at 2 inches secondary to gluteal mm deficits. Modified to seated clams. []  See Progress Note/Recertification   Patient will continue to benefit from skilled PT services to modify and progress therapeutic interventions, address functional mobility deficits, address ROM deficits, address strength deficits and analyze and address soft tissue restrictions to attain remaining goals. Progress toward goals / Updated goals:    2. Demonstrate SLS for at least 2 seconds without UE support to reduce risk for falls on stairs in community--10 sec with B UE, fair progress.     4. Demonstrate sit to stand transfer with MOE or without UE support to show increased strength and balance to reduce risk for falls--fair progress, sit to stand with B UE support, fair progress.         PLAN    [x]  Upgrade activities as tolerated YES Continue plan of care   []  Discharge due to :    []  Other:      Therapist: Claudio Joyner PT    Date: 1/4/2021 Time: 12:30 pm     Future Appointments   Date Time Provider Lana Sharma   1/4/2021 12:15 PM Mohit Crass, PT [place] [id number] [address]   1/6/2021 11:30 AM Mohit Crass, PT [place] [address] [address]   1/11/2021 12:15 PM Mohit Crass, PT [place] [address] [address]   1/13/2021 10:00 AM Mohit Crass, PT [place] [address] [address]   1/20/2021 10:00 AM Mohit Crass, PT [place] [address] [address]   1/22/2021 10:15 AM Mohit Crass, PT [place] [address] [address]   1/27/2021 10:00 AM Mohit Crass, PT [place] [address] [address]   1/29/2021 10:15 AM Mohit Crass, PT [place] [id number] [address]

## 2021-01-06 ENCOUNTER — APPOINTMENT (OUTPATIENT)
Dept: PHYSICAL THERAPY | Age: 86
End: 2021-01-06
Payer: MEDICARE

## 2021-01-08 ENCOUNTER — APPOINTMENT (OUTPATIENT)
Dept: PHYSICAL THERAPY | Age: 86
End: 2021-01-08
Payer: MEDICARE

## 2021-01-11 ENCOUNTER — HOSPITAL ENCOUNTER (OUTPATIENT)
Dept: PHYSICAL THERAPY | Age: 86
Discharge: HOME | End: 2021-01-11
Payer: MEDICARE

## 2021-01-11 PROCEDURE — 97110 THERAPEUTIC EXERCISES: CPT

## 2021-01-11 PROCEDURE — 97112 NEUROMUSCULAR REEDUCATION: CPT

## 2021-01-11 NOTE — PROGRESS NOTES
PHYSICAL THERAPY - DAILY TREATMENT NOTE      Patient Name: Ni Isaac        Date: 2021  : 1926   YES Patient  Verified  Visit #:   10   of   14  Insurance: Payor: Rolanda Gallagher / Plan: [place] MEDICARE PART A & B / Product Type: Medicare /      In time: 12:30 Out time: 1:00   Total Treatment Time: 30     Medicare/BCBS Time Tracking (below)   Total Timed Codes (min):  30 1:1 Treatment Time:  30     TREATMENT AREA = Other abnormalities of gait and mobility [R26.89]    SUBJECTIVE    Pain Level (on 0 to 10 scale):  0  / 10   Medication Changes/New allergies or changes in medical history, any new surgeries or procedures? NO    If yes, update Summary List   Subjective Functional Status/Changes:  []  No changes reported     \"I'm having a rough day, I'm just really tired. \"   Functional improvement balance   Functional limitation strength for ADLs       OBJECTIVE    10 min Therapeutic Exercise:  [x]  See flow sheet   Rationale:  [] Increase ROM  [x] Increase strength  [] Improve balance  [] Improve coordination [] Improve propioception  to improve patient's ability to:     [x] perform ADLs   [x] ambulate  [] perform work  [] relaxation/ sleep/pain        20 min Neuromuscular Re-ed: [x]  See flow sheet   Rationale:  [] Increase ROM  [] Increase strength  [x] Improve balance  [] Improve coordination [x] Improve propioception  to improve patient's ability to:     [x] perform ADLs   [x] ambulate  [] perform work  [] relaxation/ sleep/pain        Billed With/As:   [x] TE   [] TA   [] Neuro   [] Self Care Patient Education: [x] Review HEP    [] Progressed/Changed HEP based on:   [] positioning   [] body mechanics   [] transfers   [] heat/ice application    [] other:        throughout min Patient Education:  YES  Reviewed HEP   []  Progressed/Changed HEP based on:         Other Objective/Functional Measures:    Pain with squats  Requires verbal cues for proper form for step downs  Tolerated increased difficulty of balance exercises     Post Treatment Pain Level (on 0 to 10) scale:   0  / 10     ASSESSMENT    Assessment/Changes in Function:     Demonstrated improved balance and was able to progress all balance exercises with head turns, foam, eyes closed, or some combination of those.      []  See Progress Note/Recertification   Patient will continue to benefit from skilled PT services to modify and progress therapeutic interventions, address functional mobility deficits, address ROM deficits and address strength deficits to attain remaining goals. to attain remaining goals.    Progress toward goals / Updated goals:    [] decr pain   []inc stability   []inc balance [] inc ROM    [] inc strength  [] centralizing symptoms                    [x] progressing  Function  [x] progressing towards LTGs            [x]  progressing towards STGs  [x] Progressing towards HEP     PLAN    [x]  Upgrade activities as tolerated YES Continue plan of care   []  Discharge due to :    []  Other:      Therapist: Naheed Bajwa PT    Date: 4/73/1377 Time: 1:21 PM     Future Appointments   Date Time Provider Lana Sharma   1/13/2021 10:00 AM Loli Bazan PT [place] [address] Kaya Calvo   1/20/2021 10:00 AM Loli Bazan PT [place] [address] Kaya Calvo   1/22/2021 10:15 AM Loli Bazan PT [place] [address] Kaya Calvo   1/27/2021 10:00 AM Loli Bazan PT [place] [address] Kaya Calvo   1/29/2021 10:15 AM Loli Bazan PT [place] [address] Kaya Calvo

## 2021-01-13 ENCOUNTER — HOSPITAL ENCOUNTER (OUTPATIENT)
Dept: PHYSICAL THERAPY | Age: 86
Discharge: HOME | End: 2021-01-13
Payer: MEDICARE

## 2021-01-13 PROCEDURE — 97110 THERAPEUTIC EXERCISES: CPT

## 2021-01-13 PROCEDURE — 97112 NEUROMUSCULAR REEDUCATION: CPT

## 2021-01-13 NOTE — PROGRESS NOTES
PHYSICAL THERAPY - DAILY TREATMENT NOTE       Patient Name: Ni France        Date: 2021  : 1926   YES Patient  Verified  Visit #:     Insurance: Payor: Rolanda Gallagher / Plan: [place] MEDICARE PART A & B / Product Type: Medicare /      In time: 10 Out time:    Total Treatment Time: 45     Medicare/BCBS Time Tracking (below)   Total Timed Codes (min):  45 1:1 Treatment Time:  45     TREATMENT AREA = Other abnormalities of gait and mobility [R26.89]    SUBJECTIVE    Pain Level (on 0 to 10 scale):  0  / 10   Medication Changes/New allergies or changes in medical history, any new surgeries or procedures? NO    If yes, update Summary List   Subjective Functional Status/Changes:  []  No changes reported   Pt with report of limited ability to ambulate. Notes she has to use cane and still limping. Sees MD this day and is noting slight improvement specifically with balance. OBJECTIVE    15 min Therapeutic Exercise:  [x]  See flow sheet   Rationale:      increase ROM and increase strength to improve the patients ability to perform ADLs, IADLs with less pain     30 min Neuromuscular Re-ed: [x]  See flow sheet   Rationale:      increase strength, improve coordination, improve balance and increase proprioception to improve the patients ability to amb safely with least restrictive assistive device. Billed With/As:   [x] TE   [] TA   [x] Neuro   [] Self Care Patient Education: [x] Review HEP    [] Progressed/Changed HEP based on:   [] positioning   [] body mechanics   [] transfers   [] heat/ice application    [] other:      Other Objective/Functional Measures:    Pt with progression to: MSR EC without HT, foam step ups 2 inch with B UE support. Post Treatment Pain Level (on 0 to 10) scale:   0  / 10     ASSESSMENT    Assessment/Changes in Function:   Pt with B UE support for EC activity required.   SR with EO x 3 sec B.     []  See Progress Note/Recertification   Patient will continue to benefit from skilled PT services to modify and progress therapeutic interventions, address functional mobility deficits, address ROM deficits and address strength deficits to attain remaining goals. to attain remaining goals. Progress toward goals / Updated goals:  1. Increase FOTO score to 60 to demonstrate improved function--fair progress per subjective report.   2. Demonstrate SLS for at least 2 seconds without UE support to reduce risk for falls on stairs in community--currently at 1 sec B, fair progress.           PLAN    [x]  Upgrade activities as tolerated YES Continue plan of care   []  Discharge due to :    []  Other:      Therapist: Silvana Dykes PT    Date: 1/13/2021 Time: 12 PM     Future Appointments   Date Time Provider Lana Sharma   1/13/2021 10:00 AM Brian Sunshine PT [facility]   1/20/2021 10:00 AM Brian Sunshine PT [facility]   1/22/2021 10:15 AM Brian Sunshine PT [facility]   1/27/2021 10:00 AM Brian Sunshine PT [facility]   1/29/2021 10:15 AM Brian Sunshine, PT [facility]

## 2021-01-20 ENCOUNTER — HOSPITAL ENCOUNTER (OUTPATIENT)
Dept: PHYSICAL THERAPY | Age: 86
Discharge: HOME | End: 2021-01-20
Payer: MEDICARE

## 2021-01-20 PROCEDURE — 97112 NEUROMUSCULAR REEDUCATION: CPT

## 2021-01-20 PROCEDURE — 97110 THERAPEUTIC EXERCISES: CPT

## 2021-01-20 NOTE — PROGRESS NOTES
PHYSICAL THERAPY - DAILY TREATMENT NOTE       Patient Name: Ni France        Date: 2021  : 1926   YES Patient  Verified  Visit #:     Insurance: Payor: Rolanda Gallagher / Plan: [place] MEDICARE PART A & B / Product Type: Medicare /      In time: 10 Out time: 8   Total Treatment Time: 45     Medicare/BCBS Time Tracking (below)   Total Timed Codes (min):  45 1:1 Treatment Time:  45     TREATMENT AREA = Other abnormalities of gait and mobility [R26.89]    SUBJECTIVE    Pain Level (on 0 to 10 scale):  0  / 10   Medication Changes/New allergies or changes in medical history, any new surgeries or procedures? NO    If yes, update Summary List   Subjective Functional Status/Changes:  []  No changes reported   See PN. Pt reports she is not as fearful with amb since starting PT.     OBJECTIVE    15 min Therapeutic Exercise:  [x]  See flow sheet   Rationale:      increase ROM and increase strength to improve the patients ability to perform ADLs, IADLs with less pain     30 min Neuromuscular Re-ed: [x]  See flow sheet   Rationale:      increase strength, improve coordination, improve balance and increase proprioception to improve patients ability to amb safely with LRAD. Billed With/As:   [x] TE   [] TA   [x] Neuro   [] Self Care Patient Education: [x] Review HEP    [] Progressed/Changed HEP based on:   [x] positioning   [] body mechanics   [] transfers   [] heat/ice application    [] other:      Other Objective/Functional Measures:    Pt with progression to: SLS EC with B UE support, lateral toe tap and step downs to 2 and 4 inch. Post Treatment Pain Level (on 0 to 10) scale:   0  / 10     ASSESSMENT    Assessment/Changes in Function:   See PN. Pt requires min A with stepping up, down with PT support for safety.      []  See Progress Note/Recertification   Patient will continue to benefit from skilled PT services to modify and progress therapeutic interventions, address functional mobility deficits, address ROM deficits and address strength deficits to attain remaining goals. to attain remaining goals.    Progress toward goals / Updated goals:  See PN.           PLAN    [x]  Upgrade activities as tolerated YES Continue plan of care   []  Discharge due to :    []  Other:      Therapist: Anival Valle PT    Date: 1/20/2021 Time: 10 AM     Future Appointments   Date Time Provider Lana Sharma   1/20/2021 10:00 AM Víctor Salguero, PT [facility]   1/22/2021 10:15 AM Víctor Salguero, PT [facility]   1/27/2021 10:00 AM Víctor Salguero, PT [facility]   1/29/2021 10:15 AM Víctor Salguero, PT [facility]

## 2021-01-20 NOTE — PROGRESS NOTES
[id number] PHYSICAL THERAPY AT [address] [facility], [address] [place] [address], [address], [address] [place] [address] - Phone: [phone]  Fax: [phone]          Patient Name: Ni Potter : 1926   Treatment/Medical Diagnosis: Other abnormalities of gait and mobility [R26.89]   Onset Date: 2020    Referral Source: Belinda Ramos MD [facility]): 2020   Prior Hospitalization: See Medical History Provider #: [id number]   Comorbidities Arthritis, pacemaker, high blood pressure   Prior Level of Function: Lives alone, ind in ADLs, used cane on and off as needed   Medications: Verified on Patient Summary List   Visits from [facility]: 12 Missed Visits: 0     Previous Goals:  1. Increase FOTO score to 60 to demonstrate improved function. 2. Demonstrate SLS for at least 2 seconds without UE support to reduce risk for falls on stairs in community. 3. Demonstrate 100% independence with HEP in preparation for discharge. 4. Demonstrate sit to stand transfer with MOE or without UE support to show increased strength and balance to reduce risk for falls.       Prior Level/Current Level:  1) Prior Level: 49   Current Level: 49   Goal Met? no  2) Prior Level: 4 sec with Single UE   Current Level: 8 sec with single UE, unable to perform > 1 sec without UE support   Goal Met? Yes  3) Prior Level: n/a   Current Level: 50% I    Goal Met? no  4) Prior Level: unable   Current Level: Pt performs with single UE. Goal Met? yes    Key Functional Changes/Progress:  Pt demonstrating fair progress with ability to perform SLS to 8 sec B with single UE. Pt able to perform ROM EC x 20 sec with single UE support. She is able to perform step ups on 2 inch step with B LE and single UE x 5 reps.   She is 50% with current HEP including strength and balance exercises to improve I and safety with recent progression of balance activity to include MSR and standing with HHT. [place] Balance Score is currently: 35/56, considered risk for falling. Problem List: pain affecting function, decrease ROM, decrease strength, impaired gait/ balance, decrease ADL/ functional abilitiies, decrease activity tolerance, decrease flexibility/ joint mobility and decrease transfer abilities   Treatment Plan may include any combination of the following: Therapeutic exercise, Therapeutic activities, Neuromuscular re-education, Physical agent/modality, Gait/balance training, Manual therapy, Patient education, Self Care training, Functional mobility training and Home safety training  Patient Goal(s) has been updated and includes: To have less imbalance with walking with cane, improve safety    Goals for this certification period include and are to be achieved in  4  weeks:  1. Increase FOTO score to 60 to demonstrate improved function. 2. Demonstrate SLS for at least 4 seconds without UE support to reduce risk for falls on stairs in community. 3. Demonstrate 100% independence with HEP in preparation for discharge. 4. Demonstrate MSR EC x 10 sec B to improve safety with transfers and amb. 5. Madrid Balance Score will be > 45/56 to improve safety in community and home.     Frequency / Duration:   Patient to be seen   2   times per week for   4    weeks:    Assessments/Recommendations: Pt has made fair progress to LTGs, meeting 2/4 currently with LTGs updated to include Madrdi Balance Score goal to address safety. She is highly motivated to progress and would benefit from further PT improve strength for functional I as she lives independently  in home and to address safety with amb and transfers to reduce fall risk. If you have any questions/comments please contact us directly at [phone]. Thank you for allowing us to assist in the care of your patient.     Therapist Signature: Tran Wilburn PT Date: 3/57/3999   Certification Period:  Reporting Period: 1/20/21-3/19/21  n/a Time: 12 PM   NOTE TO PHYSICIAN:  PLEASE COMPLETE THE ORDERS BELOW AND FAX TO   [facility] at [address]: [phone]. If you are unable to process this request in 24 hours please contact our office: [phone].    ___ I have read the above report and request that my patient continue as recommended.   ___ I have read the above report and request that my patient continue therapy with the following changes/special instructions: ________________________________________________   ___ I have read the above report and request that my patient be discharged from therapy.      Physician Signature:        Date:       Time:

## 2021-01-22 ENCOUNTER — HOSPITAL ENCOUNTER (OUTPATIENT)
Dept: PHYSICAL THERAPY | Age: 86
Discharge: HOME | End: 2021-01-22
Payer: MEDICARE

## 2021-01-22 PROCEDURE — 97110 THERAPEUTIC EXERCISES: CPT

## 2021-01-22 PROCEDURE — 97112 NEUROMUSCULAR REEDUCATION: CPT

## 2021-01-22 NOTE — PROGRESS NOTES
PHYSICAL THERAPY - DAILY TREATMENT NOTE     Patient Name: Ni France        Date: 2021  : 1926   YES Patient  Verified  Visit #:   15   of   14  Insurance: Payor: Jose F Millan / Plan: VA MEDICARE PART A & B / Product Type: Medicare /      In time: 1079 Out time: 11   Total Treatment Time: 40     Medicare/BCBS Time Tracking (below)   Total Timed Codes (min):  40 1:1 Treatment Time:  40     TREATMENT AREA =  Other abnormalities of gait and mobility [R26.89]    SUBJECTIVE    Pain Level (on 0 to 10 scale):  0  / 10--soreness in R back and hip   Medication Changes/New allergies or changes in medical history, any new surgeries or procedures? NO    If yes, update Summary List   Subjective Functional Status/Changes:  []  No changes reported       Current symptoms: Pt denies dizziness but has had \" a few\" losses of balance since last visit. HEP performance: Notes compliance with HEP and new update helping to work on balance. OBJECTIVE      10 min Therapeutic Exercise:  [x]  See flow sheet   Rationale:      increase ROM and increase strength to improve the patients ability to perform ADLs, IADLs with improved balance    30 min Neuromuscular Re-ed: [x]  See flow sheet   Rationale:      improve coordination, improve balance and increase proprioception to improve the patients ability to perform amb safely     Billed With/As:   [] TE   [] TA   [x] Neuro   [] Self Care Patient Education: [x] Review HEP    [] Progressed/Changed HEP based on:   [] positioning   [] body mechanics   [] transfers   [] heat/ice application    [] other:        Other Objective/Functional Measures:    Advanced HHT with MSR, EO and EC with B UE support. Pt progression of static and dynamic balance to improve safety with amb, transfers. Post Treatment Pain Level (on 0 to 10) scale:  0/ 10     ASSESSMENT    Assessment/Changes in Function:     Pt with static and dynamic balance performed in parallel bars with B UE support. []  See Progress Note/Recertification   Patient will continue to benefit from skilled PT services to address functional mobility deficits, analyze and cue movement patterns, address imbalance/dizziness, and instruct in home and community integration to attain remaining goals. Progress toward goals / Updated goals:    Good progress toward recently established goals.      PLAN    [x]  Upgrade activities as tolerated YES Continue plan of care   []  Discharge due to :    []  Other:      Therapist: Nelia Avendaño PT    Date: 1/22/2021 Time: 9:51 AM     Future Appointments   Date Time Provider Lana Sharma   1/22/2021 10:15 AM Deloit Paul, PT [facility]   1/27/2021 10:00 AM Deloit Paul, PT [facility]   1/29/2021 10:15 AM Deloit Paul, PT [facility]

## 2021-01-27 ENCOUNTER — HOSPITAL ENCOUNTER (OUTPATIENT)
Dept: PHYSICAL THERAPY | Age: 86
Discharge: HOME | End: 2021-01-27
Payer: MEDICARE

## 2021-01-27 PROCEDURE — 97112 NEUROMUSCULAR REEDUCATION: CPT

## 2021-01-27 PROCEDURE — 97110 THERAPEUTIC EXERCISES: CPT

## 2021-01-27 NOTE — PROGRESS NOTES
PHYSICAL THERAPY - DAILY TREATMENT NOTE     Patient Name: Ni France        Date: 2021  : 1926   YES Patient  Verified  Visit #:      18  Insurance: Payor: Larisaradames Ricks / Plan: [place] MEDICARE PART A & B / Product Type: Medicare /      In time:  Out time: 1130   Total Treatment Time: 45     Medicare/BCBS Time Tracking (below)   Total Timed Codes (min):  45 1:1 Treatment Time:  45     TREATMENT AREA =  Other abnormalities of gait and mobility [R26.89]    SUBJECTIVE    Pain Level (on 0 to 10 scale):  0  / 10   Medication Changes/New allergies or changes in medical history, any new surgeries or procedures? NO    If yes, update Summary List   Subjective Functional Status/Changes:  []  No changes reported       Current symptoms: Pt with reports of difficulty with amb on stairs, descending and ascending with B UE support and minimizes time on stairs for concerns of falling. Has stair lift when she is fatigued. HEP performance: Pt compliant and brought her form in this am to review HEP. OBJECTIVE     15 min Therapeutic Exercise:  [x]  See flow sheet   Rationale:      increase ROM and increase strength to improve the patients ability to perform ADLs, amb with improved safety     30 min Neuromuscular Re-ed: [x]  See flow sheet   Rationale:      increase strength, improve coordination, improve balance and increase proprioception to improve the patients ability to perform amb in community with less LOB     Billed With/As:   [x] TE   [] TA   [x] Neuro   [] Self Care Patient Education: [x] Review HEP    [] Progressed/Changed HEP based on:   [] positioning   [] body mechanics   [] transfers   [] heat/ice application    [] other:        Other Objective/Functional Measures:    Pt program progressed to include marching, EC outside of parallel bars with min A from PT x 10 ft x 2. Dynamic balance activity with head and trunk rotation.      Post Treatment Pain Level (on 0 to 10) scale:   0  / 10 ASSESSMENT    Assessment/Changes in Function:     Pt with LOB x 2 during stair descending, pt with challenge noted with sidestepping and hip abduction activity. []  See Progress Note/Recertification   Patient will continue to benefit from skilled PT services to address functional mobility deficits, analyze and cue movement patterns, address imbalance/dizziness, and instruct in home and community integration to attain remaining goals. Progress toward goals / Updated goals:    5. [place] Balance Score will be > 45/56 to improve safety in community and home--fair progress as noted with ability to stand EC with 10 sec with less UE support.      PLAN    [x]  Upgrade activities as tolerated YES Continue plan of care   []  Discharge due to :    []  Other:      Therapist: Carmina Parks PT    Date: 1/27/2021 Time: 10:58 AM     Future Appointments   Date Time Provider Lana Sharma   1/29/2021 10:15 AM Mychal Lye, PT [facility]   2/3/2021 10:00 AM Mychal Deepalie, PT [facility]   2/5/2021 11:00 AM Mychal Lye, PT [facility]   2/8/2021 11:15 AM Mychal Lye, PT [facility]   2/12/2021 11:00 AM Mychal Lye, PT [facility]   2/15/2021 11:15 AM Mychal Lye, PT [facility]   2/17/2021 10:00 AM Mychal Lye, PT [facility]   2/22/2021 11:15 AM Mychal Lye, PT [facility]   2/26/2021 10:15 AM Mychal Lye, PT [facility]

## 2021-01-29 ENCOUNTER — HOSPITAL ENCOUNTER (OUTPATIENT)
Dept: PHYSICAL THERAPY | Age: 86
Discharge: HOME | End: 2021-01-29
Payer: MEDICARE

## 2021-01-29 PROCEDURE — 97112 NEUROMUSCULAR REEDUCATION: CPT

## 2021-01-29 PROCEDURE — 97110 THERAPEUTIC EXERCISES: CPT

## 2021-01-29 NOTE — PROGRESS NOTES
PHYSICAL THERAPY - DAILY TREATMENT NOTE      Patient Name: Ni Ruiz        Date: 2021  : 1926   YES Patient  Verified  Visit #:   15      18  Insurance: Payor: Rochelle Stoll / Plan: [place] MED[place] PART A & B / Product Type: Medicare /      In time: 11:45 Out time: 12:30   Total Treatment Time: 45     Medicare/BCBS Time Tracking (below)   Total Timed Codes (min):  45 1:1 Treatment Time:  45     TREATMENT AREA = Other abnormalities of gait and mobility [R26.89]    SUBJECTIVE    Pain Level (on 0 to 10 scale):  0  / 10   Medication Changes/New allergies or changes in medical history, any new surgeries or procedures? NO    If yes, update Summary List   Subjective Functional Status/Changes:  []  No changes reported     \"I have the stair lift and I am very happy with it. I'm not worried about falling anymore. \"   Functional improvement balance   Functional limitation fall risk        OBJECTIVE        15 min Therapeutic Exercise:  [x]  See flow sheet   Rationale:  [] Increase ROM  [x] Increase strength  [] Improve balance  [] Improve coordination [] Improve propioception  to improve patient's ability to:     [x] perform ADLs   [x] ambulate  [] perform work  [] relaxation/ sleep/pain      30 min Neuromuscular Re-ed: [x]  See flow sheet   Rationale:  [] Increase ROM  [] Increase strength  [x] Improve balance  [] Improve coordination [x] Improve propioception  to improve patient's ability to:     [x] perform ADLs   [x] ambulate  [] perform work  [] relaxation/ sleep/pain        Billed With/As:   [] TE   [] TA   [] Neuro   [] Self Care Patient Education: [x] Review HEP    [] Progressed/Changed HEP based on:   [] positioning   [] body mechanics   [] transfers   [] heat/ice application    [] other:        throughout min Patient Education:  YES  Reviewed HEP   []  Progressed/Changed HEP based on: Other Objective/Functional Measures:     Tolerated new there-ex well     Post Treatment Pain Level (on 0 to 10) scale:   0  / 10     ASSESSMENT    Assessment/Changes in Function:     Pt's static balance is improving as evidenced by her ability to perform modified tandem stance without UE support. Pt's strength is improving as evidenced by her ability to increase reps of navigating stairs with decreased ret in between.      []  See Progress Note/Recertification   Patient will continue to benefit from skilled PT services to modify and progress therapeutic interventions, address functional mobility deficits, address ROM deficits, address strength deficits and analyze and cue movement patterns to attain remaining goals. to attain remaining goals.    Progress toward goals / Updated goals:    [] decr pain   [x]inc stability   [x]inc balance [] inc ROM    [x] inc strength  [] centralizing symptoms                    [x] progressing  Function  [] progressing towards LTGs            []  progressing towards STGs  [] Progressing towards HEP     PLAN    [x]  Upgrade activities as tolerated YES Continue plan of care   []  Discharge due to :    []  Other:      Therapist: Terri Savage PT    Date: 5/04/6918 Time: 11:48 AM     Future Appointments   Date Time Provider Lana Sharma   2/3/2021 10:00 AM Saskia Amos PT [facility]   2/5/2021 11:00 AM Saskia Amos PT [facility]   2/8/2021 11:15 AM Saskia Amos PT [facility]   2/12/2021 11:00 AM Saskia Amos PT [facility]   2/15/2021 11:15 AM Saskia Amos PT [facility]   2/17/2021 10:00 AM Saskia Amos PT [facility]   2/22/2021 11:15 AM Saskia Amos PT [facility]   2/26/2021 10:15 AM Saskia Amos PT [facility]

## 2021-02-03 ENCOUNTER — HOSPITAL ENCOUNTER (OUTPATIENT)
Dept: PHYSICAL THERAPY | Age: 86
Discharge: HOME | End: 2021-02-03
Payer: MEDICARE

## 2021-02-03 PROCEDURE — 97112 NEUROMUSCULAR REEDUCATION: CPT

## 2021-02-03 PROCEDURE — 97110 THERAPEUTIC EXERCISES: CPT

## 2021-02-03 NOTE — PROGRESS NOTES
PHYSICAL THERAPY - DAILY TREATMENT NOTE     Patient Name: Ni France        Date: 2/3/2021  : 1926   YES Patient  Verified  Visit #:     Insurance: Payor: Dianna Madrid / Plan: [place] MEDICARE PART A & B / Product Type: Medicare /      In time: 6320 Out time: 1050   Total Treatment Time: 45     Medicare/BCBS Time Tracking (below)   Total Timed Codes (min):  45 1:1 Treatment Time:  45     TREATMENT AREA =  Other abnormalities of gait and mobility [R26.89]    SUBJECTIVE    Pain Level (on 0 to 10 scale):  0  / 10   Medication Changes/New allergies or changes in medical history, any new surgeries or procedures? NO    If yes, update Summary List   Subjective Functional Status/Changes:  []  No changes reported   Current symptoms: Pt with reports of use of stair lift for safety. She is reporting mild improvements in strength as she is able to step up a curb with slight improvement in confidence. HEP performance: Pt with compliance during HEP as recommended. OBJECTIVE     15 min Therapeutic Exercise:  [x]  See flow sheet   Rationale:      increase ROM, increase strength and improve coordination   to improve the patients ability to perform ADLs, amb with improved safety, reduced LOB. 30 min Neuromuscular Re-ed: [x]  See flow sheet   Rationale:      improve coordination, improve balance and increase proprioception    to improve the patients ability to perform amb in community with improved safety, reduced LOB. Billed With/As:   [x] TE   [] TA   [x] Neuro   [] Self Care Patient Education: [x] Review HEP    [] Progressed/Changed HEP based on:   [] positioning   [] body mechanics   [] transfers   [] heat/ice application    [] other:        Other Objective/Functional Measures:    Pt able to perform retro amb with min to mod A out of parallel bars x 10 feet. Pt with stairs x 4 (6 inches) with min A from PT.      Post Treatment Pain Level (on 0 to 10) scale:   0  / 10 ASSESSMENT    Assessment/Changes in Function:     Pt with difficulty performing > 1-2 seconds SLS B LE to improve balance. Pt with min to mod A for safety with EC and retro amb. []  See Progress Note/Recertification   Patient will continue to benefit from skilled PT services to address functional mobility deficits, analyze and cue movement patterns, address imbalance/dizziness, and instruct in home and community integration to attain remaining goals. Progress toward goals / Updated goals:    5. [place] Balance Score will be > 45/56 to improve safety in community and home--fair progress as noted with improving sit<>stand and step taps, ups on 4 inch steps.      PLAN    [x]  Upgrade activities as tolerated YES Continue plan of care   []  Discharge due to :    []  Other:      Therapist: Jessy Greene PT    Date: 2/3/2021 Time: 11 AM               Future Appointments   Date Time Provider Lana Sharma   2/5/2021 11:00 AM Valentino Borer, PT [facility]   2/8/2021 11:15 AM Valentino Borer, PT [facility]   2/12/2021 11:00 AM Valentino Borer, PT [facility]   2/15/2021 11:15 AM Valentino Borer, PT [facility]   2/17/2021 10:00 AM Valentino Borer, PT [facility]   2/22/2021 11:15 AM Valentino Borer, PT [facility]   2/26/2021 10:15 AM Valentino Borer, PT [facility]

## 2021-02-05 ENCOUNTER — HOSPITAL ENCOUNTER (OUTPATIENT)
Dept: PHYSICAL THERAPY | Age: 86
Discharge: HOME | End: 2021-02-05
Payer: MEDICARE

## 2021-02-05 PROCEDURE — 97112 NEUROMUSCULAR REEDUCATION: CPT

## 2021-02-05 PROCEDURE — 97110 THERAPEUTIC EXERCISES: CPT

## 2021-02-05 NOTE — PROGRESS NOTES
PHYSICAL THERAPY - DAILY TREATMENT NOTE     Patient Name: Ni France        Date: 2021  : 1926   YES Patient  Verified  Visit #:      20  Insurance: Payor: Shelly Gutierrez / Plan: [place] MEDICARE PART A & B / Product Type: Medicare /      In time: 11 Out time:    Total Treatment Time: 45     Medicare/BCBS Time Tracking (below)   Total Timed Codes (min):  45 1:1 Treatment Time:  45     TREATMENT AREA =  Other abnormalities of gait and mobility [R26.89]    SUBJECTIVE    Pain Level (on 0 to 10 scale):  0  / 10   Medication Changes/New allergies or changes in medical history, any new surgeries or procedures? NO    If yes, update Summary List   Subjective Functional Status/Changes:  []  No changes reported       Functional improvements: Pt reporting she is able to walk a few steps now without cane to assist with small motion in home and improve safety. Functional impairments: Pt with LOB during amb in [place]         OBJECTIVE      15 min Therapeutic Exercise:  [x]  See flow sheet   Rationale:      increase ROM and increase strength to improve the patients ability to perform ADLs safely in home    30 min Neuromuscular Re-ed: [x]  See flow sheet   Rationale:      improve coordination, improve balance and increase proprioception to improve the patients ability to perform amb with less LOB and improved safety in community. Billed With/As:   [x] TE   [] TA   [x] Neuro   [] Self Care Patient Education: [x] Review HEP    [x] Progressed/Changed HEP based on: TB strengthening for hip abductors  [] positioning   [] body mechanics   [x] transfers   [] heat/ice application    [] other:        Other Objective/Functional Measures:  Modified 4 inch step tap lateral from 6 inches. Advanced dynamic gait activity to improve safety with amb in home and in community.        Post Treatment Pain Level (on 0 to 10) scale:  0 / 10     ASSESSMENT    Assessment/Changes in Function:     *Modified to 4 inch step for lateral height vs 6 inch secondary to compensation and pain in R lateral trunk. []  See Progress Note/Recertification   Patient will continue to benefit from skilled PT services to modify and progress therapeutic interventions, address functional mobility deficits, address ROM deficits, address strength deficits, analyze and cue movement patterns and address imbalance/dizziness to attain remaining goals. Progress toward goals / Updated goals:    1. Increase FOTO score to 60 to demonstrate improved function--fair progress per subjective report with amb distance and cleaning in home. 2. Demonstrate SLS for at least 4 seconds without UE support to reduce risk for falls on stairs in community--2 sec currently, fair progress.   3. Demonstrate 100% independence with HEP in preparation for discharge--good progress, with 50% I with updated there ex in seated from last visit.   4. Demonstrate MSR EC x 10 sec B to improve safety with transfers and amb--fair progress at 5 sec B this am.     PLAN    [x]  Upgrade activities as tolerated YES Continue plan of care   []  Discharge due to :    []  Other:      Therapist: Chanel Whitaker PT    Date: 2/5/2021 Time: 10:22 AM     Future Appointments   Date Time Provider Lana Sharma   2/5/2021 11:00 AM [facility], PT [facility]   2/8/2021 11:15 AM [facility], PT [facility]   2/12/2021 11:00 AM [facility], PT [facility]   2/15/2021 11:15 AM [facility], PT [facility]   2/17/2021 10:00 AM [facility], PT [facility]   2/22/2021 11:15 AM [facility], PT [facility]   2/26/2021 10:15 AM [facility], PT [facility]

## 2021-02-08 ENCOUNTER — HOSPITAL ENCOUNTER (OUTPATIENT)
Dept: PHYSICAL THERAPY | Age: 86
Discharge: HOME | End: 2021-02-08
Payer: MEDICARE

## 2021-02-08 PROCEDURE — 97110 THERAPEUTIC EXERCISES: CPT

## 2021-02-08 PROCEDURE — 97112 NEUROMUSCULAR REEDUCATION: CPT

## 2021-02-08 NOTE — PROGRESS NOTES
PHYSICAL THERAPY - DAILY TREATMENT NOTE     Patient Name: Ni France        Date: 2021  : 1926   YES Patient  Verified  Visit #:   18      20  Insurance: Payor: Tasia Racer / Plan: [place] MEDICARE PART A & B / Product Type: Medicare /      In time:  Out time: 12   Total Treatment Time: 45     Medicare/BCBS Time Tracking (below)   Total Timed Codes (min):  45 1:1 Treatment Time:  45     TREATMENT AREA =  Other abnormalities of gait and mobility [R26.89]    SUBJECTIVE    Pain Level (on 0 to 10 scale):  0 / 10   Medication Changes/New allergies or changes in medical history, any new surgeries or procedures? NO    If yes, update Summary List   Subjective Functional Status/Changes:  []  No changes reported       Current symptoms: Pt with R sided soreness with amb with SPC. Noting improvements in balance but still reporting R sided weakness with transfers, ADLs, amb. HEP performance: seated there ex with improvements over the weekend, able to perform increased reps with resistance band and used firm pillow for hip strengthening. OBJECTIVE    15 min Therapeutic Exercise:  [x]  See flow sheet   Rationale:      increase ROM and increase strength to improve the patients ability to perform safe amb on curbs, stairs     30 min Neuromuscular Re-ed: [x]  See flow sheet   Rationale:      improve coordination, improve balance and increase proprioception to improve the patients ability to perform safe amb with LRAD   Billed With/As:   [x] TE   [] TA   [x] Neuro   [] Self Care Patient Education: [x] Review HEP    [] Progressed/Changed HEP based on:   [] positioning   [] body mechanics   [] transfers   [] heat/ice application    [] other:        Other Objective/Functional Measures:    Pt with increased duration of hold for SLS with UE support. Able to progress with reduced rest breaks during tx.      Post Treatment Pain Level (on 0 to 10) scale:   0 / 10     ASSESSMENT    Assessment/Changes in Function: Pt with SLS with L sided 1 UE x 5 sec. MSR EC x 5 sec L, 6 sec R improved with reduced UE support. []  See Progress Note/Recertification   Patient will continue to benefit from skilled PT services to address functional mobility deficits, analyze and cue movement patterns, address imbalance/dizziness, and instruct in home and community integration to attain remaining goals. Progress toward goals / Updated goals:    4. Demonstrate MSR EC x 10 sec B to improve safety with transfers and amb--fair progress, currently at 5-6 sec B.  5. [place] Balance Score will be > 45/56 to improve safety in community and home--fair progress with EC, ROM and tapping to step, ascending stairs improvements noted.      PLAN    [x]  Upgrade activities as tolerated YES Continue plan of care   []  Discharge due to :    []  Other:      Therapist: Nighat Bailey PT    Date: 2/8/2021 Time: 9:57 AM     Future Appointments   Date Time Provider Lana Sharma   2/8/2021 11:15 AM Russ Alcantar PT [facility]   2/12/2021 11:00 AM Russ Alcantar PT [facility]   2/15/2021 11:15 AM Russ Alcantar PT [facility]   2/17/2021 10:00 AM Russ Alcantar PT [facility]   2/22/2021 11:15 AM Russ Alcantar PT [facility]   2/26/2021 10:15 AM Russ Alcantar PT [facility]

## 2021-02-12 ENCOUNTER — HOSPITAL ENCOUNTER (OUTPATIENT)
Dept: PHYSICAL THERAPY | Age: 86
Discharge: HOME | End: 2021-02-12
Payer: MEDICARE

## 2021-02-12 PROCEDURE — 97112 NEUROMUSCULAR REEDUCATION: CPT

## 2021-02-12 PROCEDURE — 97110 THERAPEUTIC EXERCISES: CPT

## 2021-02-12 NOTE — PROGRESS NOTES
PHYSICAL THERAPY - DAILY TREATMENT NOTE     Patient Name: Ni France        Date: 2021  : 1926   YES Patient  Verified  Visit #:     Insurance: Payor: Daysi Peterson / Plan: [place] MEDICARE PART A & B / Product Type: Medicare /      In time: 8633 Out time: 1150   Total Treatment Time: 45     Medicare/BCBS Time Tracking (below)   Total Timed Codes (min):  45 1:1 Treatment Time:  45     TREATMENT AREA =  Other abnormalities of gait and mobility [R26.89]    SUBJECTIVE    Pain Level (on 0 to 10 scale):  0 / 10   Medication Changes/New allergies or changes in medical history, any new surgeries or procedures? NO    If yes, update Summary List   Subjective Functional Status/Changes:  []  No changes reported     Pt reports she is having more pain in lumbar region this am.  Noting mild improvements in strength with stair ascending. OBJECTIVE    15 min Therapeutic Exercise:  [x]  See flow sheet   Rationale:      increase ROM, increase strength and improve balance to improve the patients ability to perform safe amb in community    30 min Neuromuscular Re-ed: [x]  See flow sheet   Rationale:      increase ROM, increase strength, improve coordination, improve balance and increase proprioception to improve the patients ability to perform safe amb in community    Billed With/As:   [x] TE   [] TA   [x] Neuro   [] Self Care Patient Education: [x] Review HEP    [] Progressed/Changed HEP based on:   [] positioning   [] body mechanics   [] transfers   [] heat/ice application    [] other:        Other Objective/Functional Measures:  Updated there ex with strength in seated. Added resistance weight at ankle for seated strengthening. Post Treatment Pain Level (on 0 to 10) scale:   0 / 10     ASSESSMENT    Assessment/Changes in Function:   Pt with good response to updated there ex and HEP.      []  See Progress Note/Recertification   Patient will continue to benefit from skilled PT services to address functional mobility deficits, analyze and cue movement patterns, address imbalance/dizziness, and instruct in home and community integration to attain remaining goals. Progress toward goals / Updated goals:  Pt with ed on updated HEP prior to update. Pt with fair progress toward LTGs of I with HEP 3. Demonstrate 100% independence with HEP in preparation for discharge. Tanner Shearer      PLAN    [x]  Upgrade activities as tolerated YES Continue plan of care   []  Discharge due to :    []  Other:      Therapist: Marce Arreola PT    Date: 2/12/2021 Time: 10:21 AM     Future Appointments   Date Time Provider Lana Sharma   2/12/2021 11:00 AM Trell Ramos PT [facility]   2/15/2021 11:15 AM Trell Ramos PT [facility]   2/17/2021 10:00 AM Trell Ramos PT [facility]   2/22/2021 11:15 AM Trell Ramos PT [facility]   2/26/2021 10:15 AM Trell Ramos, PT [facility]

## 2021-02-15 ENCOUNTER — HOSPITAL ENCOUNTER (OUTPATIENT)
Dept: PHYSICAL THERAPY | Age: 86
Discharge: HOME | End: 2021-02-15
Payer: MEDICARE

## 2021-02-15 PROCEDURE — 97112 NEUROMUSCULAR REEDUCATION: CPT

## 2021-02-15 PROCEDURE — 97110 THERAPEUTIC EXERCISES: CPT

## 2021-02-15 NOTE — PROGRESS NOTES
PHYSICAL THERAPY - DAILY TREATMENT NOTE     Patient Name: Ni France        Date: 2/15/2021  : 1926   YES Patient  Verified  Visit #:      24  Insurance: Payor: Lauren Sykes / Plan: [place] MEDICARE PART A & B / Product Type: Medicare /      In time:  Out time: 12   Total Treatment Time: 45     Medicare/BCBS Time Tracking (below)   Total Timed Codes (min):  45 1:1 Treatment Time:  45     TREATMENT AREA =  Other abnormalities of gait and mobility [R26.89]    SUBJECTIVE    Pain Level (on 0 to 10 scale):  0 / 10   Medication Changes/New allergies or changes in medical history, any new surgeries or procedures? NO    If yes, update Summary List   Subjective Functional Status/Changes:  []  No changes reported     Pt reports she feels more confident with amb on uneven surfaces but she continues to use stair lift when performing stairs at home for safety. Noting improvements in balance with reduced LOB during reduced lighting conditions. Reports she uses cane and places it beside her bed now to increase safety. OBJECTIVE    15 min Therapeutic Exercise:  [x]  See flow sheet   Rationale:      increase ROM and increase strength   to improve the patients ability to perform safe amb in community    30 min Neuromuscular Re-ed: [x]  See flow sheet   Rationale:      increase ROM, increase strength, improve coordination, improve balance and increase proprioception   to improve the patients ability to perform safe amb in community    Billed With/As:   [x] TE   [] TA   [x] Neuro   [] Self Care Patient Education: [x] Review HEP    [] Progressed/Changed HEP based on:   [] positioning   [] body mechanics   [] transfers   [] heat/ice application    [] other:        Other Objective/Functional Measures:  NMR: MR with EC on foam with min A from B UE support. Advanced to 2# for LE strengthening seated.      Post Treatment Pain Level (on 0 to 10) scale:   0 / 10     ASSESSMENT    Assessment/Changes in Function: Unable to progress to 2# in standing for hip abduction secondary to compensation patterns. Pt ed on use of ankle weights for HEP with good response.     []  See Progress Note/Recertification   Patient will continue to benefit from skilled PT services to address functional mobility deficits, analyze and cue movement patterns, address imbalance/dizziness, and instruct in home and community integration to attain remaining goals. Progress toward goals / Updated goals:  Pt with good progress toward LTGs. Reduced frequency of visits to 1x/week secondary to good progress. Prep for D/C x 3 visits if LTGs met. PLAN    [x]  Upgrade activities as tolerated YES Continue plan of care   []  Discharge due to :    [x]  Other: Reassess at next visit.      Therapist: Paulette Martinez, PT    Date: 2/15/2021 Time: 10 AM     Future Appointments   Date Time Provider Lana Sharma   2/22/2021 11:15 AM Devaughn Ignacio PT [facility]   3/1/2021  9:45 AM Devaughn Ignacio PT [facility]   3/8/2021  9:45 AM Devaughn Ignacio PT [facility]

## 2021-02-17 ENCOUNTER — APPOINTMENT (OUTPATIENT)
Dept: PHYSICAL THERAPY | Age: 86
End: 2021-02-17
Payer: MEDICARE

## 2021-02-22 ENCOUNTER — HOSPITAL ENCOUNTER (OUTPATIENT)
Dept: PHYSICAL THERAPY | Age: 86
Discharge: HOME | End: 2021-02-22
Payer: MEDICARE

## 2021-02-22 PROCEDURE — 97110 THERAPEUTIC EXERCISES: CPT

## 2021-02-22 PROCEDURE — 97112 NEUROMUSCULAR REEDUCATION: CPT

## 2021-02-22 NOTE — PROGRESS NOTES
PHYSICAL THERAPY - DAILY TREATMENT NOTE     Patient Name: Ni France        Date: 2021  : 1926   YES Patient  Verified  Visit #:     Insurance: Payor: Augustus Carlson / Plan: [place] MEDICARE PART A & B / Product Type: Medicare /      In time: 1120 Out time: 12   Total Treatment Time: 40     Medicare/BCBS Time Tracking (below)   Total Timed Codes (min):  40 1:1 Treatment Time:  40     TREATMENT AREA =  Other abnormalities of gait and mobility [R26.89]    SUBJECTIVE    Pain Level (on 0 to 10 scale):  0 / 10   Medication Changes/New allergies or changes in medical history, any new surgeries or procedures? NO    If yes, update Summary List   Subjective Functional Status/Changes:  []  No changes reported   See PN/Recertification     OBJECTIVE    15 min Therapeutic Exercise:  [x]  See flow sheet   Rationale:      increase ROM and increase strength   to improve the patients ability to perform safe amb, transfers in community    25 min Neuromuscular Re-ed: [x]  See flow sheet   Rationale:      increase ROM, increase strength, improve coordination, improve balance and increase proprioception   to improve the patients ability to perform safe amb, transfers in community    Billed With/As:   [x] TE   [] TA   [x] Neuro   [] Self Care Patient Education: [x] Review HEP    [] Progressed/Changed HEP based on:   [] positioning   [] body mechanics   [] transfers   [] heat/ice application    [] other:        Other Objective/Functional Measures:  See PN/Recertification   Post Treatment Pain Level (on 0 to 10) scale:   0 / 10     ASSESSMENT    Assessment/Changes in Function:   See PN/Recertification   []  See Progress Note/Recertification   Patient will continue to benefit from skilled PT services to address functional mobility deficits, analyze and cue movement patterns, address imbalance/dizziness, and instruct in home and community integration to attain remaining goals.    Progress toward goals / Updated goals:  See PN/Recertification     PLAN    [x]  Upgrade activities as tolerated YES Continue plan of care   []  Discharge due to :    []  Other:      Therapist: Hazel Malloy PT    Date: 2/22/2021 Time: 10 AM     Future Appointments   Date Time Provider Lana Sharma   3/1/2021  9:45 AM Aga Harper, PT [facility]   3/8/2021  9:45 AM Aga Harper PT [facility]

## 2021-02-22 NOTE — PROGRESS NOTES
1762 [facility] PHYSICAL THERAPY AT [address] 95 [facility], [address] [place] [address] Way, [address], [address] [place] [address] Ln - Phone: [phone]  Fax: [phone]          Patient Name: Ni Curiel : 1926   Treatment/Medical Diagnosis: Other abnormalities of gait and mobility [R26.89]   Onset Date: 2020    Referral Source: Rosa Mays MD [facility]): 2020   Prior Hospitalization: See Medical History Provider #: [id number]   Comorbidities Arthritis, pacemaker, high blood pressure   Prior Level of Function: Lives alone, ind in ADLs, used cane on and off as needed   Medications: Verified on Patient Summary List   Vis[facility] from [facility]: 22 Missed Visits: 0     Previous Goals:  1. Increase FOTO score to 60 to demonstrate improved function. 2. Demonstrate SLS for at least 4 seconds without UE support to reduce risk for falls on stairs in community. 3. Demonstrate 100% independence with HEP in preparation for discharge. 4. Demonstrate MSR EC x 10 sec B to improve safety with transfers and amb. 5. [place] Balance Score will be > 45/56 to improve safety in community and home. Prior Level/Current Level:  1) Prior Level: 49   Current Level: 47   Goal Met? no  2) Prior Level: 4 sec with Single UE   Current Level: 8 sec with single UE, able to perform 2 sec without UE support   Goal Met? No  3) Prior Level:  50% I    Current Level: 100% independence with updated program   Goal Met? yes  4) Prior Level: Pt able to perform ROM EC x 20 sec with single UE support. Current Level: MSR x 5 sec with B UE support   Goal Met? No  5) Prior Level: 35/56   Current Level: 38/56   Goal Met? no    Key Functional Changes/Progress:  Pt demonstrating fair progress with static and dynamic balance. She is now able to perform SLS to 10 sec B with single UE. Pt able to perform modified sharpened EC x 20 sec with single UE support.   She is able to perform step ups on 4 inch step from 2 inch step with ROCÍO WHEAT at last update on 1/20/21, 10 reps prior to fatigue. She is I with current HEP. [place] Balance Score is currently: 38/56, considered risk for falling, however, improvements have been noted with 3 point change. Problem List: pain affecting function, decrease ROM, decrease strength, impaired gait/ balance, decrease ADL/ functional abilitiies, decrease activity tolerance, decrease flexibility/ joint mobility and decrease transfer abilities   Treatment Plan may include any combination of the following: Therapeutic exercise, Therapeutic activities, Neuromuscular re-education, Physical agent/modality, Gait/balance training, Manual therapy, Patient education, Self Care training, Functional mobility training and Home safety training  Patient Goal(s) has been updated and includes: To have less imbalance with walking      Goals for this certification period include and are to be achieved in  4  weeks:  1. Increase FOTO score to 60 to demonstrate improved function. 2. Demonstrate SLS for at least 4 seconds without UE support to reduce risk for falls on stairs in community. 3. Demonstrate MSR EC x 10 sec B to improve safety with transfers and amb. 4. [place] Balance Score will be > 45/56 to improve safety in community and home.     Frequency / Duration:   Patient to be seen   1  times per week for  3  treatments:    Assessments/Recommendations: Pt has made good progress toward LTGs and has met 1/5 with good progress toward remaining goals. She is demonstrating improvements in [facility] Score and strength, balance testing. She will be seen 1x/week with pt ed on progression of HEP for balance and strength to improve overall function. If you have any questions/comments please contact us directly at [phone]. Thank you for allowing us to assist in the care of your patient.     Therapist Signature: Michelle Xiao, PT Date: 4/85/0044   Certification Period:  Reporting Period: 2/22/21-4/21/21 11/23/20-2/22/21 Time: 12 PM   NOTE TO PHYSICIAN:  PLEASE COMPLETE THE ORDERS BELOW AND FAX TO   [facility] [place] [facility] at [place]: [phone]. If you are unable to process this request in 24 hours please contact our office: [phone].    ___ I have read the above report and request that my patient continue as recommended.   ___ I have read the above report and request that my patient continue therapy with the following changes/special instructions: ________________________________________________   ___ I have read the above report and request that my patient be discharged from therapy.      Physician Signature:        Date:       Time:

## 2021-02-26 ENCOUNTER — APPOINTMENT (OUTPATIENT)
Dept: PHYSICAL THERAPY | Age: 86
End: 2021-02-26
Payer: MEDICARE

## 2021-03-01 ENCOUNTER — APPOINTMENT (OUTPATIENT)
Dept: PHYSICAL THERAPY | Age: 86
End: 2021-03-01
Payer: MEDICARE

## 2021-03-08 ENCOUNTER — HOSPITAL ENCOUNTER (OUTPATIENT)
Dept: PHYSICAL THERAPY | Age: 86
Discharge: HOME | End: 2021-03-08
Payer: MEDICARE

## 2021-03-08 PROCEDURE — 97112 NEUROMUSCULAR REEDUCATION: CPT

## 2021-03-08 PROCEDURE — 97110 THERAPEUTIC EXERCISES: CPT

## 2021-03-08 NOTE — PROGRESS NOTES
PHYSICAL THERAPY - DAILY TREATMENT NOTE     Patient Name: Ni France        Date: 3/8/2021  : 1926   YES Patient  Verified  Visit #:    Insurance: Payor: Dianna Madrid / Plan: [place] PART A & B / Product Type: Medicare /      In time: 950 Out time: 1257   Total Treatment Time: 45     Medicare/BCBS Time Tracking (below)   Total Timed Codes (min):  45 1:1 Treatment Time:  45     TREATMENT AREA =  Other abnormalities of gait and mobility [R26.89]    SUBJECTIVE    Pain Level (on 0 to 10 scale): 5  / 10   Medication Changes/New allergies or changes in medical history, any new surgeries or procedures? NO    If yes, update Summary List   Subjective Functional Status/Changes:  []  No changes reported     Pt reports she is having more symptoms overall and noting increased duration of pain in her R hip with change in activity last week. Did not exercise as much related to R hip pain. OBJECTIVE    15 min Therapeutic Exercise:  [x]  See flow sheet   Rationale:      increase ROM and increase strength to improve the patients ability to amb safely, perform stepping with improved safety. 30 min Neuromuscular Re-ed: [x]  See flow sheet   Rationale:      improve coordination, improve balance, and increase proprioception to improve the patients ability to safe amb in community    Billed With/As:   [x] TE   [] TA   [x] Neuro   [] Self Care Patient Education: [x] Review HEP    [] Progressed/Changed HEP based on:   [] positioning   [] body mechanics   [] transfers   [] heat/ice application    [] other:        Other Objective/Functional Measures:    D/C prep initiated, reviewed and updated HEP. Post Treatment Pain Level (on 0 to 10) scale:  3  / 10     ASSESSMENT    Assessment/Changes in Function:     Pt able to perform standing there ex, unable to perform standing on foam related to pain.   Pt with good response and understanding of previous HEP, plan to reassess at next visit for long term functional outcomes. []  See Progress Note/Recertification   Patient will continue to benefit from skilled PT services to modify and progress therapeutic interventions, address functional mobility deficits, address ROM deficits, and address strength deficits to attain remaining goals. Progress toward goals / Updated goals:    Pt with limited progress toward LTGs with increased pain this day in her R hip. However, overall progress is good and she is expected to meet all LTGs. PLAN    [x]  Upgrade activities as tolerated YES Continue plan of care   []  Discharge due to :    []  Other:      Therapist: Shaunna Zuluaga PT    Date: 3/8/2021 Time: 10:02 AM   No future appointments.

## 2021-03-15 ENCOUNTER — HOSPITAL ENCOUNTER (OUTPATIENT)
Dept: PHYSICAL THERAPY | Age: 86
Discharge: HOME | End: 2021-03-15
Payer: MEDICARE

## 2021-03-15 PROCEDURE — 97112 NEUROMUSCULAR REEDUCATION: CPT

## 2021-03-15 PROCEDURE — 97110 THERAPEUTIC EXERCISES: CPT

## 2021-03-15 NOTE — PROGRESS NOTES
PHYSICAL THERAPY - DAILY TREATMENT NOTE     Patient Name: Ni Purdy        Date: 3/15/2021  : 1926   YES Patient  Verified  Visit #:     Insurance: Payor: Chuyita Nugent / Plan: [place] MEDICARE PART A & B / Product Type: Medicare /      In time: 1120 Out time: 7906   Total Treatment Time: 45     Medicare/BCBS Time Tracking (below)   Total Timed Codes (min):  45 1:1 Treatment Time:  40     TREATMENT AREA =  Other abnormalities of gait and mobility [R26.89]    SUBJECTIVE    Pain Level (on 0 to 10 scale): 0 / 10   Medication Changes/New allergies or changes in medical history, any new surgeries or procedures? NO    If yes, update Summary List   Subjective Functional Status/Changes:  []  No changes reported       See D/C Summary. OBJECTIVE    15/10 min Therapeutic Exercise:  [x]  See flow sheet   Rationale:      increase ROM and increase strength to improve the patients ability to perform LE ADLs and transfers safely     30 min Neuromuscular Re-ed: [x]  See flow sheet   Rationale:      improve coordination, improve balance, and increase proprioception to improve the patients ability to amb safely. Billed With/As:   [x] TE   [] TA   [x] Neuro   [] Self Care Patient Education: [x] Review HEP    [] Progressed/Changed HEP based on:   [x] positioning   [] body mechanics   [] transfers   [] heat/ice application    [] other:        Other Objective/Functional Measures:    ALEJANDRE 44/56 from   Final HEP and D/C instructions issued. Post Treatment Pain Level (on 0 to 10) scale:   0  / 10     ASSESSMENT    Assessment/Changes in Function:     See D/C Summary. []  See Progress Note/Recertification   Patient will continue to benefit from skilled PT services to modify and progress therapeutic interventions, address functional mobility deficits, address ROM deficits, and address strength deficits to attain remaining goals. Progress toward goals / Updated goals:    See D/C Summary. PLAN    [x]  Upgrade activities as tolerated YES Continue plan of care   []  Discharge due to :    []  Other:      Therapist: Christopher Callejas PT    Date: 3/15/2021 Time: 10:25 AM     Future Appointments   Date Time Provider Lana Sharma   3/15/2021 11:15 AM Geneva Mike PT [facility]

## 2021-03-15 NOTE — PROGRESS NOTES
4[facility] PHYSICAL THERAPY AT [address] [facility], [address] [place] [address] Way, [address], [address] [place] [address] - Phone: [phone]  Fax: [phone]  DISCHARGE SUMMARY FOR PHYSICAL THERAPY          Patient Name: Francis Braxton : 1926   Treatment/Medical Diagnosis: Other abnormalities of gait and mobility [R26.89]   Onset Date: 2020    Referral Source: Darling Kaplan MD [facility]): 2020   Prior Hospitalization: See Medical History Provider #: [id number]   Comorbidities Arthritis, pacemaker, high blood pressure   Prior Level of Function: Lives alone, ind in ADLs, used cane on and off as needed   Medications: Verified on Patient Summary List   Visits from [facility]: 23 Missed Visits: 0     Previous Goals:  1. Increase FOTO score to 60 to demonstrate improved function. 2. Demonstrate SLS for at least 4 seconds without UE support to reduce risk for falls on stairs in community. 3. Demonstrate MSR EC x 10 sec B to improve safety with transfers and amb. 4. [place] Balance Score will be > 45/56 to improve safety in community and home. Prior Level/Current Level:  1) Prior Level: 49   Current Level: 47   Goal Met? no  2) Prior Level: 4 sec with Single UE   Current Level: 4 sec without UE support   Goal Met? yes  3) Prior Level:  MSR x 5 sec B   Current Level: MSR x 10 sec B   Goal Met? yes  4) Prior Level: 38/56   Current Level: 44/56   Goal Met? No, good progress. Key Functional Changes/Progress:  Pt demonstrating fair progress with static and dynamic balance. She is now able to perform SLS to 4 sec B. Pt able to perform modified sharpened EC x 30 sec with single UE support. She is able to perform step ups on 4 inch step from 2 inch step with B LE at last update on 21. She is I with current HEP and has been ed on D/C prep and issued instructions.   Mad[place] Balance Score is currently: 44/56 from 38/56, improvements have been noted with 6 point improvement noted.    Assessments/Recommendations: Pt has made good progress toward LTGs and has met 2/4 LTGs, with 1 point lower than goal for [place] Balance score. She would benefit from D/C to HEP at continue to address strength and ROM. She will follow up with MD PRN. If you have any questions/comments please contact us directly at [phone]. Thank you for allowing us to assist in the care of your patient.     Therapist Signature: Russ Heart PT Date: 2/72/8549   Certification Period:  Reporting Period: n/a  11/23/20-3/15/21 Time: 12 PM

## 2022-07-02 NOTE — PROGRESS NOTES
Cardiology Progress Note - Calista Mayers 46 y o  male MRN: [id number]    Unit/Bed#: [facility] 607-01 Encounter: [id number]      Assessment:  Principal Problem:    SBO  Active Problems:    Atrial fibrillation with RVR (HCC)    COPD (chronic obstructive pulmonary disease) (HCC)    Acute respiratory failure (HCC)      Plan:  Patient with no chest pain or significant dyspnea  Continues with NG tube in place  Atrial fibrillation on telemetry with moderate ventricular response  On intravenous heparin and TPN  Surgical note appreciated  Hemoglobin today 8 2  Creatinine 0 73  Will continue current cardiac regimen  Subjective:   Patient seen and examined  Objective:     Vitals: Blood pressure 107/64, pulse 100, temperature (!) 97 4 °F (36 3 °C), resp  rate 20, height 5' 11" (1 803 m), weight (!) 223 kg (491 lb 10 oz), SpO2 94 %  , Body mass index is 68 57 kg/m² ,   Orthostatic Blood Pressures    Flowsheet Row Most Recent Value   Blood Pressure 107/64 filed at 07/02/2022 0732   Patient Position - Orthostatic VS Lying filed at 06/29/2022 0731      ,      Intake/Output Summary (Last 24 hours) at 7/2/2022 0854  Last data filed at 7/2/2022 3949  Gross per 24 hour   Intake 2801 49 ml   Output 5425 ml   Net -2623 51 ml       No significant arrhythmias seen on telemetry review  Physical Exam:    GEN: Calista Mayers   HEART: normal rate, regular rhythm, normal S1 and S2, no murmurs, clicks, gallops or rubs   LUNGS: clear to auscultation bilaterally; no wheezes, rales, or rhonchi   EXTREMITIES: edema    Labs & Results:    No results displayed because visit has over 200 results  CT abdomen pelvis wo contrast    Result Date: 6/4/2022  Narrative: CT ABDOMEN AND PELVIS WITHOUT IV CONTRAST INDICATION:   Bowel obstruction suspected Abdominal pain, acute, nonlocalized diffuse abdominal pain and vomiting, Hx of obstruction    COMPARISON:  None   TECHNIQUE:  CT examination of the abdomen and pelvis was performed without PHYSICAL THERAPY - DAILY TREATMENT NOTE      Patient Name: Ni Burnham        Date: 12/15/2020  : 1926   YES Patient  Verified  Visit #:     Insurance: Payor: Cynthia Vidal / Plan: [facility] [place] [place] & B / Product Type: Medicare /      In time: 11:04 Out time: 11:47    Total Treatment Time: 43     Medicare/BCBS Time Tracking (below)   Total Timed Codes (min):  43 1:1 Treatment Time: 43     TREATMENT AREA = Other abnormalities of gait and mobility [R26.89]    SUBJECTIVE    Pain Level (on 0 to 10 scale):  0  / 10   Medication Changes/New allergies or changes in medical history, any new surgeries or procedures? NO    If yes, update Summary List   Subjective Functional Status/Changes:  []  No changes reported   Improvement doing some of HEP everyday   Functional limitation prolonged walking,  ADLs, stairs        OBJECTIVE        17 min Therapeutic Exercise:  [x]  See flow sheet   Rationale:    Rationale:  increase ROM and increase strength to improve the patients ability to    [x] perform ADLs   [x] ambulate  [x] perform work  [] relaxation/ sleep           14 min Neuromuscular Re-ed: [x]  See flow sheet   Rationale:   Rationale:  improve coordination and improve balance to improve the patients ability to    [x] perform ADLs   [x] ambulate  [] perform work  [] relaxation/ sleep         12 min Gait Training: In // bars,pt education in stepping strategy over 4 inch step, backwards/later walks,  SBA to CGA    Rationale:  to improve ambulation safety and efficiency in order to improve patient's ability to safely ambulate at home for self care.     Billed With/As:   [x] TE   [x] Gait   [x] Neuro   [] Self Care Patient Education: [x] Review HEP    [x] Progressed/Changed HEP based on: added sit<>stand 5x   [] positioning   [] body mechanics   [] transfers   [] heat/ice application    [] other:        throughout min Patient Education:  YES  Reviewed HEP   []  Progressed/Changed HEP based on: Other Objective/Functional Measures:  Add quarter squats 5 reps -held secondary to pt reporting left knee pain. Pt education in sit to stand body mechanics       Post Treatment Pain Level (on 0 to 10) scale:   0  / 10     ASSESSMENT    Assessment/Changes in Function:     Add sit to stand from high mat to improving weight shift forward and up with VCs for proper body mechanics. Close S in all static and dynamic balance exercise secondary to decreased use of hip and ankle balance strategy. Pt progressing to reciprocal gait while ascending stairs and bilateral rails; single step and bilateral rails for descending. []  See Progress Note/Recertification   Patient will continue to benefit from skilled PT services to modify and progress therapeutic interventions, address functional mobility deficits, address ROM deficits, address strength deficits, analyze and cue movement patterns, analyze and modify body mechanics/ergonomics, assess and modify postural abnormalities and address imbalance/dizziness to attain remaining goals. to attain remaining goals.    Progress toward goals / Updated goals:    [] decr pain   [x]inc stability   [x]inc balance [x] inc ROM    [x] inc strength  [] centralizing symptoms                    [] progressing  Function  [] progressing towards LTGs            []  progressing HEP     PLAN    [x]  Upgrade activities as tolerated YES Continue plan of care   []  Discharge due to :    []  Other:      Therapist: Jeanie Garcia PTA    Date: 12/15/2020 Time: 11:47 am     Future Appointments   Date Time Provider Lana Sharma   12/18/2020 11:45 AM Darlene Hill PT [facility] intravenous contrast  This examination was performed without intravenous contrast in the context of the critical nationwide Omnipaque shortage  Axial, sagittal, and coronal 2D reformatted images were created from the source data and submitted for interpretation  Radiation dose length product (DLP) for this visit:  2495 6 mGy-cm   This examination, like all CT scans performed in the [facility], was performed utilizing techniques to minimize radiation dose exposure, including the use of iterative  reconstruction and automated exposure control  Enteric contrast was administered  FINDINGS: ABDOMEN LOWER CHEST:  No clinically significant abnormality identified in the visualized lower chest  LIVER/BILIARY TREE:  Unremarkable  GALLBLADDER:  No calcified gallstones  No pericholecystic inflammatory change  SPLEEN:  Unremarkable  PANCREAS:  Unremarkable  ADRENAL GLANDS:  Unremarkable  KIDNEYS/URETERS:  Unremarkable  No hydronephrosis  STOMACH AND BOWEL:  Markedly distended duodenum and proximal jejunal up to 6 5 cm with abrupt transition in the left abdomen (series 201, image 52)  APPENDIX:  No findings to suggest appendicitis  ABDOMINOPELVIC CAVITY:  No ascites  No pneumoperitoneum  No lymphadenopathy  VESSELS:  Unremarkable for patient's age  PELVIS REPRODUCTIVE ORGANS:  Unremarkable for patient's age  URINARY BLADDER:  Unremarkable  ABDOMINAL WALL/INGUINAL REGIONS:  Large ventral abdominal wall hernia containing numerous loops of small and large bowel  OSSEOUS STRUCTURES:  No acute fracture or destructive osseous lesion  Impression: Findings consistent with small bowel obstruction at the proximal jejunum with transition point at the left upper abdomen  Findings discussed with Dr Nelly Tadeo at [address] [facility] AM, 6/4/2022 Workstation performed: [id number]     XR chest portable    Result Date: 6/24/2022  Narrative: CHEST INDICATION:   Concern for aspiration   COMPARISON:  June 21, 2022 EXAM PERFORMED/VIEWS:  XR CHEST PORTABLE Single image FINDINGS:  Diminished lung volumes  Mild cardiomegaly  Diffuse interstitial prominence with peribronchial thickening and vascular congestion  Enteric tube in: The stomach  Right PICC line catheter can be followed to the mid SVC, tip is not seen  Numerous EKG leads in place  The lungs are clear  No pneumothorax or pleural effusion  Osseous structures appear within normal limits for patient age  Impression: Diminished lung volumes  Pulmonary edema exaggerated by vascular crowding  Workstation performed: [id number]     XR chest portable    Result Date: 6/22/2022  Narrative: CHEST INDICATION:   vomiting/aspiration rapid response  COMPARISON:  6/18/2022 EXAM PERFORMED/VIEWS:  XR CHEST PORTABLE FINDINGS: Interval removal of the ET tube is visualized  Interval removal of the right-sided central line is visualized  Persistent NG tube is seen extending into the mid stomach  Persistent right-sided PICC line is seen extending into the superior vena cava  Limited examination due to low lung volume and rotation of the patient  There is persistent bilateral particularly left perihilar and basilar infiltrate  Associated bilateral pleural effusion cannot be excluded  No pneumothorax is visualized  Unchanged cardiac silhouette is visualized  Impression: Interval removal of the ET tube and right-sided central line with persistent NG tube and right-sided PICC line Unchanged bilateral particularly left-sided airspace disease identified Workstation performed: [id number]     XR chest portable    Result Date: 6/13/2022  Narrative: CHEST INDICATION:   line placement  COMPARISON:  May 25, 2022 EXAM PERFORMED/VIEWS:  XR CHEST PORTABLE FINDINGS:  A nasogastric tube has been placed  It extends below the diaphragm but its tip is not included on this exam   A right-sided PICC line is present with its tip in the expected location of the superior vena cava  The heart is enlarged    The pulmonary vessels are distended  Evaluation of the lungs is limited by patient size  There appear to be hazy opacities in both lungs  No evidence of consolidation  There is no evidence of pleural effusion or pneumothorax  Osseous structures appear within normal limits for patient age  Impression: 1  Tip of right-sided PICC line in superior vena cava  2   3   Nasogastric tube present although tip not included on this exam  4   Pulmonary vascular congestion and suggestion of mild pulmonary edema  Workstation performed: [id number]     XR abdomen 1 view kub    Result Date: 6/30/2022  Narrative: ABDOMEN INDICATION:   Need to assess for progression of contrast given earlier today for CT scan with PO contrast  COMPARISON:  Abdominal radiograph June 29, 2022 at 12:49 VIEWS:  AP supine FINDINGS: Tip of enteric tube projects over the stomach  Single short segment contrast-filled loop of small bowel is dilated to 5 cm in the central abdomen, likely within the distal duodenum  Enteric contrast opacifies the nondistended ascending, transverse, and proximal descending colon  Mildly dilated loops of air-filled colon in the lower left abdomen  Impression: Contrast has passed into the proximal descending colon  Mildly dilated loops of air-filled distal colon  A distal obstruction is not excluded, and follow-up radiograph could be considered at this time to confirm progression of contrast  The study was marked in [facility] for immediate notification  Workstation performed: [id number]     XR abdomen 1 view kub    Result Date: 6/27/2022  Narrative: ABDOMEN INDICATION:  NGT placement  COMPARISON:  Abdominal radiographs 6/21/2022, CT abdomen and pelvis 6/4/2022 VIEWS:  AP semierect-portable Images: 1 *(centered over the thoracoabdominal junction as per protocol for this indication); The lower abdomen and pelvis were excluded from the field-of-view   FINDINGS: Enteric tube courses to the stomach, the distal tip is looped back upon itself terminating in the region of the distal esophagus  Nonspecific bowel gas pattern  Contrast material seen within central small bowel loop  No discernible free air on this limited portable study  No pathologic calcifications or soft tissue masses as visualized  Visualized lung bases are grossly clear  Visualized osseous structures are unremarkable for the patient's age  Impression: Enteric tube tip looped upon itself terminating in the region of the distal esophagus  Repositioning advised  Nonspecific bowel gas pattern  The study was marked in [facility] for immediate notification  Workstation performed: [id number]     XR abdomen 1 view kub    Result Date: 6/22/2022  Narrative: ABDOMEN INDICATION:   persistant emesis after sx, sbo  COMPARISON:  None VIEWS:  AP supine FINDINGS: 3 x-rays of the abdomen were obtained  NG tube is seen extending into the distal stomach  Persistent bilateral particularly left-sided perihilar and basilar infiltrate possibly associated with bilateral pleural effusion  No pneumoperitoneum is visualized  Nonspecific abdominal gas pattern is visualized including air-filled dilated small bowel loops in this context of small bowel obstruction Degenerative changes is seen within the spine  Impression: NG tube in correct location  Persistent bilateral particularly left-sided pleuroparenchymal changes Persistent dilated small bowel loops in this context of small bowel obstruction Workstation performed: [id number]     XR abdomen 1 view kub    Result Date: 6/13/2022  Narrative: ABDOMEN INDICATION:   Bowel gas pattern  Follow contrast  SBO    COMPARISON:  Abdominal radiograph from June 9, 2022 VIEWS:  AP supine FINDINGS: Limited examination due to the patient's size  There has been decrease in the extent of bowel distention since the last exam   Enteric contrast now opacifies colon from cecum to distal descending colon  No discernible free air on this supine study    Upright or left lateral decubitus imaging is more sensitive to detect subtle free air in the appropriate setting  No pathologic calcifications or soft tissue masses  Visualized lung bases are clear  Visualized osseous structures are unremarkable for the patient's age  Impression: 1  Markedly limited examination due to the patient's size  2   Decrease in the degree of bowel distention since 6/9/2022  3   Enteric contrast is now located in the colon, extending from the cecum to the distal descending colon  Workstation performed: [id number]     XR abdomen obstruction series    Result Date: 6/9/2022  Narrative: OBSTRUCTION SERIES INDICATION:   SBO, follow up oral contrast  COMPARISON:  Chest x-ray performed the previous day  EXAM PERFORMED/VIEWS:  XR ABDOMEN OBSTRUCTION SERIES 10 FINDINGS: This study is limited due to the patient's large body habitus  The distal tip of the enteric tube is in the left upper quadrant of the abdomen in the stomach  There are persistent distended loops of small bowel in the left abdomen consistent with known small bowel obstruction  There is oral contrast within nondistended colon  A retrievable IVC filter is noted  No free air beneath the hemidiaphragms  No pathologic calcifications or soft tissue masses evident  Osseous structures are unremarkable  Examination of the chest reveals a normal cardiomediastinal silhouette  Lungs are clear  Impression: Persistent partial small bowel obstruction  Workstation performed: [id number]     XR abdomen obstruction series    Result Date: 6/9/2022  Narrative: OBSTRUCTION SERIES INDICATION:   SBO  COMPARISON:  Abdominal radiographs dated 6/6/2022  CT abdomen/pelvis dated 6/4/2022  EXAM PERFORMED/VIEWS:  XR ABDOMEN OBSTRUCTION SERIES FINDINGS: Markedly technically limited examination due to body habitus and underpenetration  Tip of enteric tube overlies the stomach  Persistently dilated small bowel loops in the left hemiabdomen  Oral contrast has progressed to the right colon   No definite air in the rectum  IVC filter noted  Impression: Technically limited examination due to body habitus and underpenetration  Persistently dilated small bowel loops in the left hemiabdomen in this patient with known small bowel obstruction  Contrast has progressed to the right colon  No definite air in the rectum  Workstation performed: [id number]     XR abdomen obstruction series    Result Date: 6/6/2022  Narrative: OBSTRUCTION SERIES INDICATION:   Small bowel obstruction  Abdominal pain  COMPARISON:  June 5, 2022 EXAM PERFORMED/VIEWS:  XR ABDOMEN OBSTRUCTION SERIES FINDINGS: Enteric tube present, tip overlying left upper quadrant of the abdomen  Examination is limited secondary to patient body habitus and underpenetration  Gaseous distention of a portion of the stomach is noted  Cholecystectomy clips noted  IVC filter noted  No evidence for acute osseous abnormality  Impression: Enteric tube in expected location, tip beneath left hemidiaphragm  Workstation performed: [id number]     XR abdomen obstruction series    Result Date: 6/5/2022  Narrative: OBSTRUCTION SERIES INDICATION:   eval sbo progress  COMPARISON:  Abdomen and pelvic CT from 6/4/2022  EXAM PERFORMED/VIEWS:  XR ABDOMEN OBSTRUCTION SERIES FINDINGS: Nasogastric tube in the stomach  Unchanged gastric and small bowel distention consistent with small bowel obstruction  No free air beneath the hemidiaphragms  No pathologic calcifications or soft tissue masses evident  IVC filter noted  Osseous structures are unremarkable  Examination of the chest reveals a normal cardiomediastinal silhouette  Lungs are clear  Impression: Unchanged gastric and small bowel distention consistent with small bowel obstruction  Workstation performed: [id number]     FL barium swallow video w speech    Result Date: 6/23/2022  Narrative: A video barium swallow study was performed by the Department of Speech Pathology   Please refer to the report for the official interpretation  The images are stored for archival purposes only  Study images were not formally reviewed by the Radiology Department  XR abdomen 1 vw portable    Result Date: 6/29/2022  Narrative: ABDOMEN INDICATION:   NGT placement  COMPARISON:  None VIEWS:  AP supine FINDINGS: Repositioned enteric tube tip is in the region of the proximal stomach  Oral contrast in the stomach and proximal small bowel  No dilated bowel, fluid levels or free air identified  Visualized lung bases are clear  Stable cardiomediastinal silhouette  No acute osseous pathology  IVC filter  Impression: Enteric tube in the stomach  Workstation performed: [id number]     XR chest portable ICU    Result Date: 6/19/2022  Narrative: CHEST INDICATION:   Eval for volume overload, pleural effusion  COMPARISON:  Chest radiographs June 17, 2022, May 25, 2022, and April 3, 2022  CT chest October 13, 2016  EXAM PERFORMED/VIEWS:  XR CHEST PORTABLE ICU FINDINGS:  Image quality limited by patient body habitus and low lung volumes  Enteric tube terminates below the diaphragm, endotracheal tube satisfactorily positioned 4 5 cm above the cornelio  Right internal jugular central venous catheter terminates in expected location of superior vena cava just above the right main bronchus  Moderate cardiomegaly  Pulmonary vasculature indistinct  The left hemidiaphragm remains silhouetted concerning for consolidation the left lung base raising the possibility of pneumonia  Osseous structures appear within normal limits for patient age  Impression: 1  Image quality limited but suspicious for pneumonia in the left lung base unchanged since the most recent prior study  Superimposed CHF would also be a consideration  2   Lines and tubes in satisfactory position   Workstation performed: [id number]     XR chest portable ICU    Result Date: 6/17/2022  Narrative: CHEST INDICATION:   R/o PTX  COMPARISON:  6/17/2022 EXAM PERFORMED/VIEWS:  XR CHEST PORTABLE ICU FINDINGS:  ET tube tip at the level of the clavicles  NG tube tip below the diaphragm  Right IJ approach central venous catheter seen with its tip junction of the SVC and right atrium  Right upper extremity approach PICC line seen with its tip at the junction of the SVC and right atrium  Heart shadow is enlarged and there is pulmonary vascular congestion concerning for CHF  Questioned left basilar infiltrate  Cathlean Deshaun No pneumothorax or pleural effusion  Osseous structures appear within normal limits for patient age  Impression: Questioned left basilar infiltrate  Cathlean Deshaun Heart shadow is enlarged and there is pulmonary vascular congestion concerning for CHF  Workstation performed: [id number]     XR chest portable ICU    Result Date: 6/17/2022  Narrative: CHEST INDICATION:   Decreased TV, increased peak pressures and secretions, eval for PTX and consolidaiton  COMPARISON:  Chest radiograph June 13, 2022 EXAM PERFORMED/VIEWS:  XR CHEST PORTABLE ICU FINDINGS:  Tip of endotracheal tube is approximately 5 5 cm above the cornelio  Distal end of enteric tube is beneath the diaphragm; tip is excluded from field-of-view Heart shadow is enlarged but unchanged from prior exam  Prominence of the interstitial markings  No definitive consolidation or pleural effusion  No pneumothorax  Impression: Pulmonary vascular congestion without a definitive consolidation or effusion  No pneumothorax  Workstation performed: [id number]     [address] [facility] bedside procedure    Result Date: 6/18/2022  Narrative: 1 2 840 [id number] 2 323 [id number] 2     bedside procedure    Result Date: 6/14/2022  Narrative: 1 2 840 [id number]  0 [id number]  7 [id number]    IR PICC line placement double lumen    Result Date: 6/13/2022  Narrative: Peripherally inserted central venous catheter Clinical History: IV access  Fluoroscopy time: 0 minutes   Findings: A fluoroscopic spot view of the chest was obtained demonstrating a 5 [place] double-lumen PICC line terminating at the RA/SVC junction  The procedure was performed by the interventional radiology staff  All elements of maximal sterile barrier technique, cap and mask and sterile gown and sterile gloves and sterile full-body drape and hand hygiene and 2% chlorhexidine for cutaneous antisepsis  Sterile ultrasound technique with sterile gel and sterile probe covers was also utilized  Impression: Impression: PICC line centrally positioned  Workstation performed: [id number]       EKG personally reviewed by Romulo Booth MD      Counseling / Coordination of Care  Total floor / unit time spent today 30 minutes  Greater than 50% of total time was spent with the patient and / or family counseling and / or coordination of care

## 2024-10-09 ENCOUNTER — HOSPITAL ENCOUNTER (OUTPATIENT)
Facility: HOSPITAL | Age: 89
Setting detail: RECURRING SERIES
Discharge: HOME | End: 2024-10-12
Payer: MEDICARE

## 2024-10-09 PROCEDURE — 97161 PT EVAL LOW COMPLEX 20 MIN: CPT

## 2024-10-09 PROCEDURE — 97110 THERAPEUTIC EXERCISES: CPT

## 2024-10-09 NOTE — PROGRESS NOTES
PHYSICAL / OCCUPATIONAL THERAPY - DAILY TREATMENT NOTE (updated )  For Eval visit    Patient Name: Viridiana Guan    Date: 10/9/2024    : 1926  Insurance: Payor: MEDICARE / Plan: MEDICARE PART A AND B / Product Type: *No Product type* /      Patient  verified yes     Visit #   Current / Total 1 16   Time   In / Out 340 425p   Pain   In / Out 7 4   Subjective Functional Status/Changes: See POC     TREATMENT AREA =  see POC    OBJECTIVE    Modalities Rationale:     decrease pain to improve patient's ability to progress to PLOF and address remaining functional goals.     min [] Estim Unattended, type/location:                                      []  w/ice    []  w/heat    min [] Estim Attended, type/location:                                     []  w/US     []  w/ice    []  w/heat    []  TENS insruct      min []  Mechanical Traction: type/lbs                   []  pro   []  sup   []  int   []  cont    []  before manual    []  after manual    min []  Ultrasound, settings/location:     10 min  unbill [x]  Ice     []  Heat    location/position: Seated to R shoulder    min []  Paraffin,  details:     min []  Vasopneumatic Device, press/temp:     min []  Whirlpool / Fluido:    If using vaso (only need to measure limb vaso being performed on)      pre-treatment girth :       post-treatment girth :       measured at (landmark location) :      min []  Other:    Skin assessment post-treatment:   Intact        30 min   Eval - untimed                      Therapeutic Procedures:    Tx Min Billable or 1:1 Min (if diff from Tx Min) Procedure, Rationale, Specifics   5  [id number] Therapeutic Exercise (timed):  increase ROM, strength, coordination, balance, and proprioception to improve patient's ability to progress to PLOF and address remaining functional goals. (see flow sheet as applicable)     Details if applicable:              Details if applicable:            Details if applicable:            Details if applicable:

## 2024-10-09 NOTE — THERAPY EVALUATION
with use of RUE.  4 Pt will demonstrate Faulkner Balance Score to >40/56 to improve safety in community    Frequency / Duration: Patient to be seen 1-2 times per week for 8 WEEKS    Patient/ Caregiver education and instruction: Diagnosis, prognosis, self care, activity modification, and exercises [x]  Plan of care has been reviewed with PTA    Certification Period: 10/09/24-12/08/24    Michelle ANNELIESE Dunlap, PT       10/9/2024       9:57 AM    Payor: MEDICARE / Plan: MEDICARE PART A AND B / Product Type: *No Product type* /     Physician signature required for Medicare, Medicaid, Worker's Comp, Direct Access   ===================================================================  I certify that the above Therapy Services are being furnished while the patient is under my care. I agree with the treatment plan and certify that this therapy is necessary.    Physician's Signature:_________________________   DATE:_________   TIME:________                           Fer Saavedra MD    ** Signature, Date and Time must be completed for valid certification **  [facility]. Thank you

## 2024-10-11 ENCOUNTER — HOSPITAL ENCOUNTER (OUTPATIENT)
Facility: HOSPITAL | Age: 89
Setting detail: RECURRING SERIES
Discharge: HOME | End: 2024-10-14
Payer: MEDICARE

## 2024-10-11 PROCEDURE — 97530 THERAPEUTIC ACTIVITIES: CPT

## 2024-10-11 PROCEDURE — 97112 NEUROMUSCULAR REEDUCATION: CPT

## 2024-10-11 PROCEDURE — 97140 MANUAL THERAPY 1/> REGIONS: CPT

## 2024-10-11 NOTE — PROGRESS NOTES
PHYSICAL / OCCUPATIONAL THERAPY - DAILY TREATMENT NOTE    Patient Name: Viridiana Guan    Date: 10/11/2024    : 1926  Insurance: Payor: MEDICARE / Plan: MEDICARE PART A AND B / Product Type: *No Product type* /      Patient  verified Yes     Visit #   Current / Total 2 16   Time   In / Out 940 1035   Pain   In / Out 4 2-3   Subjective Functional Status/Changes: Pt reports she tried to use ice but still having questions about propping arm during ADLs.     TREATMENT AREA =  Right shoulder pain [M25.511]     OBJECTIVE    Heat/CP (UNBILLED):  location/position: semi reclined  .    Min Rationale   5/5 decrease pain to improve patient's ability to progress to PLOF and address remaining functional goals.     Skin assessment post-treatment:   Intact     Therapeutic Procedures:    44486 Neuromuscular Re-Education (timed):  improve balance, coordination, kinesthetic sense, posture, core stability and proprioception to improve patient's ability to develop conscious control of individual muscles and awareness of position of extremities in order to progress to PLOF and address remaining functional goals.   Tx Min Billable or 1:1 Min   (if diff from Tx Min) Details:   20  See flow sheet as applicable     06179 Manual Therapy (timed):  decrease pain and increase ROM to improve patient's ability to progress to PLOF and address remaining functional goals.  The manual therapy interventions were performed at a separate and distinct time from the therapeutic activities interventions .   Tx Min Billable or 1:1 Min   (if diff from Tx Min) Details:   10  See flow sheet as applicable, STM to R shoulder f/b PROM     39518 Therapeutic Activity (timed):  use of dynamic activities replicating functional movements to increase ROM, strength, coordination, balance, and proprioception in order to improve patient's ability to progress to PLOF and address remaining functional goals.   Tx Min Billable or 1:1 Min   (if diff from Tx Min)

## 2024-10-14 ENCOUNTER — HOSPITAL ENCOUNTER (OUTPATIENT)
Facility: HOSPITAL | Age: 89
Setting detail: RECURRING SERIES
Discharge: HOME | End: 2024-10-17
Payer: MEDICARE

## 2024-10-14 PROCEDURE — 97140 MANUAL THERAPY 1/> REGIONS: CPT

## 2024-10-14 PROCEDURE — 97530 THERAPEUTIC ACTIVITIES: CPT

## 2024-10-14 PROCEDURE — 97112 NEUROMUSCULAR REEDUCATION: CPT

## 2024-10-14 NOTE — PROGRESS NOTES
PHYSICAL / OCCUPATIONAL THERAPY - DAILY TREATMENT NOTE    Patient Name: Viridiana Guan    Date: 10/14/2024    : 1926  Insurance: Payor: MEDICARE / Plan: MEDICARE PART A AND B / Product Type: *No Product type* /      Patient  verified Yes     Visit #   Current / Total 3 16   Time   In / Out 9 955   Pain   In / Out 4 2-3   Subjective Functional Status/Changes: Pt reports she did not get to do her exercises much. Has more pain this am.     TREATMENT AREA =  Right shoulder pain [M25.511]     OBJECTIVE    Heat/CP (UNBILLED):  location/position: semi reclined  .    Min Rationale   5/5 decrease pain to improve patient's ability to progress to PLOF and address remaining functional goals.     Skin assessment post-treatment:   Intact     Therapeutic Procedures:    09137 Neuromuscular Re-Education (timed):  improve balance, coordination, kinesthetic sense, posture, core stability and proprioception to improve patient's ability to develop conscious control of individual muscles and awareness of position of extremities in order to progress to PLOF and address remaining functional goals.   Tx Min Billable or 1:1 Min   (if diff from Tx Min) Details:   20  See flow sheet as applicable     23547 Manual Therapy (timed):  decrease pain and increase ROM to improve patient's ability to progress to PLOF and address remaining functional goals.  The manual therapy interventions were performed at a separate and distinct time from the therapeutic activities interventions .   Tx Min Billable or 1:1 Min   (if diff from Tx Min) Details:   10  STM to R shoulder f/b PROM     06823 Therapeutic Activity (timed):  use of dynamic activities replicating functional movements to increase ROM, strength, coordination, balance, and proprioception in order to improve patient's ability to progress to PLOF and address remaining functional goals.   Tx Min Billable or 1:1 Min   (if diff from Tx Min) Details:   15  See flow sheet as applicable       45

## 2024-10-16 ENCOUNTER — HOSPITAL ENCOUNTER (OUTPATIENT)
Facility: HOSPITAL | Age: 89
Setting detail: RECURRING SERIES
Discharge: HOME | End: 2024-10-19
Payer: MEDICARE

## 2024-10-16 PROCEDURE — 97110 THERAPEUTIC EXERCISES: CPT

## 2024-10-16 PROCEDURE — 97112 NEUROMUSCULAR REEDUCATION: CPT

## 2024-10-16 PROCEDURE — 97140 MANUAL THERAPY 1/> REGIONS: CPT

## 2024-10-16 NOTE — PROGRESS NOTES
PHYSICAL / OCCUPATIONAL THERAPY - DAILY TREATMENT NOTE    Patient Name: Viridiana Guan    Date: 10/16/2024    : 1926  Insurance: Payor: MEDICARE / Plan: MEDICARE PART A AND B / Product Type: *No Product type* /      Patient  verified Yes     Visit #   Current / Total 4 16   Time   In / Out 1020a 1110   Pain   In / Out 4 2-3   Subjective Functional Status/Changes: Pt reports she feel some progress with her shoulder pain.     TREATMENT AREA =  Right shoulder pain [M25.511]     OBJECTIVE    Heat/CP (UNBILLED):  location/position: semi reclined  .    Min Rationale   5/5 decrease pain to improve patient's ability to progress to PLOF and address remaining functional goals.     Skin assessment post-treatment:   Intact     Therapeutic Procedures:    65921 Neuromuscular Re-Education (timed):  improve balance, coordination, kinesthetic sense, posture, core stability and proprioception to improve patient's ability to develop conscious control of individual muscles and awareness of position of extremities in order to progress to PLOF and address remaining functional goals.   Tx Min Billable or 1:1 Min   (if diff from Tx Min) Details:   20  See flow sheet as applicable     13958 Manual Therapy (timed):  decrease pain and increase ROM to improve patient's ability to progress to PLOF and address remaining functional goals.  The manual therapy interventions were performed at a separate and distinct time from the therapeutic activities interventions .   Tx Min Billable or 1:1 Min   (if diff from Tx Min) Details:   10  STM to R shoulder f/b PROM   17740 Therapeutic Exercise (timed):  increase ROM, strength, coordination, balance, and proprioception to improve patient's ability to progress to PLOF and address remaining functional goals.   Tx Min Billable or 1:1 Min   (if diff from Tx Min) Details:   10  See flow sheet as applicable       40  [facility] Totals Reminder: bill using total billable min of TIMED therapeutic procedures

## 2024-10-18 ENCOUNTER — HOSPITAL ENCOUNTER (OUTPATIENT)
Facility: HOSPITAL | Age: 89
Setting detail: RECURRING SERIES
Discharge: HOME | End: 2024-10-21
Payer: MEDICARE

## 2024-10-18 PROCEDURE — 97112 NEUROMUSCULAR REEDUCATION: CPT

## 2024-10-18 PROCEDURE — 97110 THERAPEUTIC EXERCISES: CPT

## 2024-10-18 PROCEDURE — 97140 MANUAL THERAPY 1/> REGIONS: CPT

## 2024-10-18 PROCEDURE — 97530 THERAPEUTIC ACTIVITIES: CPT

## 2024-10-18 NOTE — PROGRESS NOTES
PHYSICAL / OCCUPATIONAL THERAPY - DAILY TREATMENT NOTE    Patient Name: Viridiana Guan    Date: 10/16/2024    : 1926  Insurance: Payor: MEDICARE / Plan: MEDICARE PART A AND B / Product Type: *No Product type* /      Patient  verified Yes     Visit #   Current / Total 4 16   Time   In / Out 1020a 1110   Pain   In / Out 4 2-3   Subjective Functional Status/Changes: Pt reports she feel some progress with her shoulder pain.     TREATMENT AREA =  Right shoulder pain [M25.511]     OBJECTIVE    Heat/CP (UNBILLED):  location/position: semi reclined  .    Min Rationale   5/5 decrease pain to improve patient's ability to progress to PLOF and address remaining functional goals.     Skin assessment post-treatment:   Intact     Therapeutic Procedures:    14339 Neuromuscular Re-Education (timed):  improve balance, coordination, kinesthetic sense, posture, core stability and proprioception to improve patient's ability to develop conscious control of individual muscles and awareness of position of extremities in order to progress to PLOF and address remaining functional goals.   Tx Min Billable or 1:1 Min   (if diff from Tx Min) Details:   20  See flow sheet as applicable     59734 Manual Therapy (timed):  decrease pain and increase ROM to improve patient's ability to progress to PLOF and address remaining functional goals.  The manual therapy interventions were performed at a separate and distinct time from the therapeutic activities interventions .   Tx Min Billable or 1:1 Min   (if diff from Tx Min) Details:   10  STM to R shoulder f/b PROM   72307 Therapeutic Exercise (timed):  increase ROM, strength, coordination, balance, and proprioception to improve patient's ability to progress to PLOF and address remaining functional goals.   Tx Min Billable or 1:1 Min   (if diff from Tx Min) Details:   10  See flow sheet as applicable       40  [facility] Totals Reminder: bill using total billable min of TIMED therapeutic procedures 
of pain with ADLs--25% met  2 Patient will be educated in appropriate ROM, stabilization, strengthening exercises--Pt with good progress as noted with pt ability to verbalize precautions.  3 Increase AROM shoulder flex >= 70 degrees to improve status for ADLs--60 deg flexion good progress  4 Assess pt balance with Faulkner Balance Score to improve safety with amb in community     Long Term Goals: To be accomplished in  8 weeks  1 Patient to report >= 75% improvement in symptoms of pain with ADLs  2 Patient will be independent with finalized HEP/ self maintenance.  3 Increase Quick DASH <= 40% to indicate improved function with use of RUE.  4 Pt will demonstrate Faulkner Balance Score to >40/56 to improve safety in community       Next PN/ RC due 11/08/24  Auth due RONAN    PLAN  - Continue Plan of Care  - Upgrade activities as tolerated    Michelle Dunlap PT    10/16/2024    10:51 AM  If an interpreting service was utilized for treatment of this patient, the contents of this document represent the material reviewed with the patient via the .     Future Appointments   Date Time Provider Department Center   10/18/2024 10:20 AM Michelle Dunlap PT [facility]   10/21/2024 11:40 AM Michelle Dunlap PT [facility]   10/25/2024 11:00 AM Michelle Dunlap PT [facility]   10/30/2024 10:20 AM Michelle Dunlap, PT [facility]   11/1/2024 11:40 AM Michelle Dunlap PT [facility]   11/4/2024 11:00 AM Michelle Dunlap PT [facility]   11/8/2024 11:00 AM Michelle Dunlap, PT [facility]

## 2024-10-21 ENCOUNTER — HOSPITAL ENCOUNTER (OUTPATIENT)
Facility: HOSPITAL | Age: 89
Setting detail: RECURRING SERIES
Discharge: HOME | End: 2024-10-24
Payer: MEDICARE

## 2024-10-21 PROCEDURE — 97112 NEUROMUSCULAR REEDUCATION: CPT

## 2024-10-21 PROCEDURE — 97140 MANUAL THERAPY 1/> REGIONS: CPT

## 2024-10-21 PROCEDURE — 97110 THERAPEUTIC EXERCISES: CPT

## 2024-10-21 NOTE — PROGRESS NOTES
PHYSICAL / OCCUPATIONAL THERAPY - DAILY TREATMENT NOTE    Patient Name: Viridiana Guan    Date: 10/16/2024    : 1926  Insurance: Payor: MEDICARE / Plan: MEDICARE PART A AND B / Product Type: *No Product type* /      Patient  verified Yes     Visit #   Current / Total 6 16   Time   In / Out 1145a 1235p   Pain   In / Out 0 0   Subjective Functional Status/Changes: Pt reports she has to miss visit this week on Friday, does not have transportation. Overall improvements noted in shoulder with pain reduced.     TREATMENT AREA =  Right shoulder pain [M25.511]     OBJECTIVE    Heat/CP (UNBILLED):  location/position: semi reclined  .    Min Rationale   5/5 decrease pain to improve patient's ability to progress to PLOF and address remaining functional goals.    Skin assessment post-treatment:   Intact     Therapeutic Procedures:  72617 Neuromuscular Re-Education (timed):  improve balance, coordination, kinesthetic sense, posture, core stability and proprioception to improve patient's ability to develop conscious control of individual muscles and awareness of position of extremities in order to progress to PLOF and address remaining functional goals.   Tx Min Billable or 1:1 Min   (if diff from Tx Min) Details:   17  See flow sheet as applicable     12422 Manual Therapy (timed):  decrease pain and increase ROM to improve patient's ability to progress to PLOF and address remaining functional goals.  The manual therapy interventions were performed at a separate and distinct time from the therapeutic activities interventions .   Tx Min Billable or 1:1 Min   (if diff from Tx Min) Details:   10  STM to R shoulder f/b PROM   41136 Therapeutic Exercise (timed):  increase ROM, strength, coordination, balance, and proprioception to improve patient's ability to progress to PLOF and address remaining functional goals.   Tx Min Billable or 1:1 Min   (if diff from Tx Min) Details:   13  See flow sheet as applicable     40  [facility]

## 2024-10-25 ENCOUNTER — APPOINTMENT (OUTPATIENT)
Facility: HOSPITAL | Age: 89
End: 2024-10-25
Payer: MEDICARE

## 2024-10-30 ENCOUNTER — HOSPITAL ENCOUNTER (OUTPATIENT)
Facility: HOSPITAL | Age: 89
Setting detail: RECURRING SERIES
Discharge: HOME | End: 2024-11-02
Payer: MEDICARE

## 2024-10-30 PROCEDURE — 97110 THERAPEUTIC EXERCISES: CPT

## 2024-10-30 PROCEDURE — 97112 NEUROMUSCULAR REEDUCATION: CPT

## 2024-10-30 PROCEDURE — 97530 THERAPEUTIC ACTIVITIES: CPT

## 2024-10-30 PROCEDURE — 97140 MANUAL THERAPY 1/> REGIONS: CPT

## 2024-10-30 NOTE — PROGRESS NOTES
PHYSICAL / OCCUPATIONAL THERAPY - DAILY TREATMENT NOTE    Patient Name: Viridiana Guan    Date: 10/16/2024    : 1926  Insurance: Payor: MEDICARE / Plan: MEDICARE PART A AND B / Product Type: *No Product type* /      Patient  verified Yes     Visit #   Current / Total 7 16   Time   In / Out 1030 1110   Pain   In / Out 0 0   Subjective Functional Status/Changes: Pt reports she is having difficulty with needle point but has been trying to move arm more. Caregiver reports she is not c/o as much pain as prior.     TREATMENT AREA =  Right shoulder pain [M25.511]     OBJECTIVE    Therapeutic Procedures:  68673 Neuromuscular Re-Education (timed):  improve balance, coordination, kinesthetic sense, posture, core stability and proprioception to improve patient's ability to develop conscious control of individual muscles and awareness of position of extremities in order to progress to PLOF and address remaining functional goals.     Tx Min Billable or 1:1 Min   (if diff from Tx Min) Details:   17  See flow sheet as applicable     56525 Manual Therapy (timed):  decrease pain and increase ROM to improve patient's ability to progress to PLOF and address remaining functional goals.  The manual therapy interventions were performed at a separate and distinct time from the therapeutic activities interventions .     Tx Min Billable or 1:1 Min   (if diff from Tx Min) Details:   10  STM to R shoulder (biceps, pec) f/b PROM in semi reclined position   58148 Therapeutic Activity (timed):  use of dynamic activities replicating functional movements to increase ROM, strength, coordination, balance, and proprioception in order to improve patient's ability to progress to PLOF and address remaining functional goals.   Tx Min Billable or 1:1 Min   (if diff from Tx Min) Details:   13  Pt and caregiver ed for transfers, ADLs, HEP and safety with ambulation         40  [facility] Totals Reminder: bill using total billable min of TIMED

## 2024-11-01 ENCOUNTER — HOSPITAL ENCOUNTER (OUTPATIENT)
Facility: HOSPITAL | Age: 89
Setting detail: RECURRING SERIES
Discharge: HOME | End: 2024-11-04
Payer: MEDICARE

## 2024-11-01 PROCEDURE — 97112 NEUROMUSCULAR REEDUCATION: CPT

## 2024-11-01 PROCEDURE — 97530 THERAPEUTIC ACTIVITIES: CPT

## 2024-11-01 PROCEDURE — 97140 MANUAL THERAPY 1/> REGIONS: CPT

## 2024-11-01 NOTE — PROGRESS NOTES
PHYSICAL / OCCUPATIONAL THERAPY - DAILY TREATMENT NOTE    Patient Name: Viridiana Guan    Date: 10/16/2024    : 1926  Insurance: Payor: MEDICARE / Plan: MEDICARE PART A AND B / Product Type: *No Product type* /      Patient  verified Yes     Visit #   Current / Total 8 16   Time   In / Out 1030 1110   Pain   In / Out 0 0   Subjective Functional Status/Changes: Pt reports she is having slightly less pain with shoulder movement. Still concerned about balance.     TREATMENT AREA =  Right shoulder pain [M25.511]     OBJECTIVE    Therapeutic Procedures:  61315 Neuromuscular Re-Education (timed):  improve balance, coordination, kinesthetic sense, posture, core stability and proprioception to improve patient's ability to develop conscious control of individual muscles and awareness of position of extremities in order to progress to PLOF and address remaining functional goals.     Tx Min Billable or 1:1 Min   (if diff from Tx Min) Details:   17  See flow sheet as applicable     14132 Manual Therapy (timed):  decrease pain and increase ROM to improve patient's ability to progress to PLOF and address remaining functional goals.  The manual therapy interventions were performed at a separate and distinct time from the therapeutic activities interventions .     Tx Min Billable or 1:1 Min   (if diff from Tx Min) Details:   10  STM and PROM to R shoulder    73157 Therapeutic Activity (timed):  use of dynamic activities replicating functional movements to increase ROM, strength, coordination, balance, and proprioception in order to improve patient's ability to progress to PLOF and address remaining functional goals.   Tx Min Billable or 1:1 Min   (if diff from Tx Min) Details:   13  Pt and caregiver ed for standing balance activity ed and progression of safety activity.     40  [facility] Totals Reminder: bill using total billable min of TIMED therapeutic procedures (example: do not include dry needle or estim unattended, both

## 2024-11-04 ENCOUNTER — HOSPITAL ENCOUNTER (OUTPATIENT)
Facility: HOSPITAL | Age: 89
Setting detail: RECURRING SERIES
Discharge: HOME | End: 2024-11-07
Payer: MEDICARE

## 2024-11-04 PROCEDURE — 97112 NEUROMUSCULAR REEDUCATION: CPT

## 2024-11-04 PROCEDURE — 97110 THERAPEUTIC EXERCISES: CPT

## 2024-11-04 PROCEDURE — 97530 THERAPEUTIC ACTIVITIES: CPT

## 2024-11-04 NOTE — PROGRESS NOTES
PHYSICAL / OCCUPATIONAL THERAPY - DAILY TREATMENT NOTE    Patient Name: Viridiana Guan    Date: 10/16/2024    : 1926  Insurance: Payor: MEDICARE / Plan: MEDICARE PART A AND B / Product Type: *No Product type* /      Patient  verified Yes     Visit #   Current / Total 8 16   Time   In / Out 1030 1110   Pain   In / Out 0 0   Subjective Functional Status/Changes: Pt reports she had a fall in parking lot coming into clinic.      TREATMENT AREA =  Right shoulder pain [M25.511]     OBJECTIVE    Therapeutic Procedures:  28781 Neuromuscular Re-Education (timed):  improve balance, coordination, kinesthetic sense, posture, core stability and proprioception to improve patient's ability to develop conscious control of individual muscles and awareness of position of extremities in order to progress to PLOF and address remaining functional goals.     Tx Min Billable or 1:1 Min   (if diff from Tx Min) Details:   17  See flow sheet as applicable     27365 Manual Therapy (timed):  decrease pain and increase ROM to improve patient's ability to progress to PLOF and address remaining functional goals.  The manual therapy interventions were performed at a separate and distinct time from the therapeutic activities interventions .     Tx Min Billable or 1:1 Min   (if diff from Tx Min) Details:   10  STM and PROM to R shoulder    51326 Therapeutic Activity (timed):  use of dynamic activities replicating functional movements to increase ROM, strength, coordination, balance, and proprioception in order to improve patient's ability to progress to PLOF and address remaining functional goals.   Tx Min Billable or 1:1 Min   (if diff from Tx Min) Details:   13  Pt and caregiver ed related to current status and follow up with MD PRN     40  [facility] Totals Reminder: bill using total billable min of TIMED therapeutic procedures (example: do not include dry needle or estim unattended, both untimed codes, in totals to left)  8-22 min = 1 unit;

## 2024-11-08 ENCOUNTER — HOSPITAL ENCOUNTER (OUTPATIENT)
Facility: HOSPITAL | Age: 89
Setting detail: RECURRING SERIES
Discharge: HOME | End: 2024-11-11
Payer: MEDICARE

## 2024-11-08 PROCEDURE — 97112 NEUROMUSCULAR REEDUCATION: CPT

## 2024-11-08 PROCEDURE — 97110 THERAPEUTIC EXERCISES: CPT

## 2024-11-08 PROCEDURE — 97140 MANUAL THERAPY 1/> REGIONS: CPT

## 2024-11-08 PROCEDURE — 97530 THERAPEUTIC ACTIVITIES: CPT

## 2024-11-08 NOTE — THERAPY RECERTIFICATION
[facility]   [address], [address], [place] [address] Ph: [phone] Fx: [phone]  PHYSICAL THERAPY PROGRESS NOTE  Patient Name: Viridiana Guan : 1926   Treatment/Medical Diagnosis: Right shoulder pain [M25.511]   Referral Source: Fre Saavedra MD     Date of Initial Visit: 10/09/24 Attended Visits: 8 Missed Visits: 0     SUMMARY OF TREATMENT  Therapeutic exercise, Therapeutic activities, Neuromuscular re-education, Physical agent/modality, Manual therapy and Patient education      CURRENT STATUS  Pt has been seen for 7 follow up visits after initial evaluation.  She has made fair progress toward goals.  Her pain level has improved and her ROM measurements have increased to improve function.  However, her strength continues to be limited and she is unable to perform ADLs with independence level as prior to injury. Pt reports one fall since starting PT. Her Faulkner Balance was assessed at .    STGs:  1 Patient will report >= 25% improvement in symptoms of pain with ADLs  2 Patient will be educated in appropriate ROM, stabilization, strengthening exercises  3 Increase AROM shoulder flex >= 70 degrees to improve status for ADLs  4 Assess pt balance with Faulkner Balance Score to improve safety with amb in community        Status at last Eval: Pt with pain level at 6-8/10  Current Status: Pt with pain level at 2-6/10  Goal Met?  yes    2.     Status at last Eval: n/a  Current Status: Pt and caregiver ed  Goal Met?  yes    3.    Status at last Eval: FF to 50 deg  Current Status: FF to 72 deg  Goal Met?  yes  4.    Status at last Eval: n/a  Current Status: Faulkner Balance at   Goal Met? yes      Payor: MEDICARE / Plan: MEDICARE PART A AND B / Product Type: *No Product type* /     Medicare, cannot change goals, cannot adjust frequency/duration, no signature required   Reporting Period: (date from last Prog Note/Eval to current Prog

## 2024-11-08 NOTE — PROGRESS NOTES
PHYSICAL / OCCUPATIONAL THERAPY - DAILY TREATMENT NOTE    Patient Name: Viridiana Guan    Date: 2024    : 1926  Insurance: Payor: MEDICARE / Plan: MEDICARE PART A AND B / Product Type: *No Product type* /      Patient  verified Yes     Visit #   Current / Total 9 16   Time   In / Out 11 1203p   Pain   In / Out 0 0   Subjective Functional Status/Changes: Pt reports she did not have more pain after last visit.  See PN.     TREATMENT AREA =  Right shoulder pain [M25.511]       OBJECTIVE    Modalities Rationale:     decrease pain to improve patient's ability to progress to PLOF and address remaining functional goals.     min [] Estim Unattended, type/location:                                      []  w/ice    []  w/heat    min [] Estim Attended, type/location:                                     []  w/US     []  w/ice    []  w/heat    []  TENS insruct      min []  Mechanical Traction: type/lbs                   []  pro   []  sup   []  int   []  cont    []  before manual    []  after manual    min []  Ultrasound, settings/location:      min  unbill []  Ice     []  Heat    location/position:     min []  Paraffin,  details:     min []  Vasopneumatic Device, press/temp:     min []  Whirlpool / Fluido:    If using vaso (only need to measure limb vaso being performed on)      pre-treatment girth :       post-treatment girth :       measured at (landmark location) :      min []  Other:    Skin assessment post-treatment:   Intact     Therapeutic Procedures:  21193 Neuromuscular Re-Education (timed):  improve balance, coordination, kinesthetic sense, posture, core stability and proprioception to improve patient's ability to develop conscious control of individual muscles and awareness of position of extremities in order to progress to PLOF and address remaining functional goals.     Tx Min Billable or 1:1 Min   (if diff from Tx Min) Details:   17  See flow sheet as applicable     70344 Manual Therapy (timed):

## 2024-11-13 ENCOUNTER — HOSPITAL ENCOUNTER (OUTPATIENT)
Facility: HOSPITAL | Age: 89
Setting detail: RECURRING SERIES
Discharge: HOME | End: 2024-11-16
Payer: MEDICARE

## 2024-11-13 PROCEDURE — 97110 THERAPEUTIC EXERCISES: CPT

## 2024-11-13 PROCEDURE — 97530 THERAPEUTIC ACTIVITIES: CPT

## 2024-11-13 PROCEDURE — 97112 NEUROMUSCULAR REEDUCATION: CPT

## 2024-11-13 NOTE — PROGRESS NOTES
Min) Details:   25  See flow sheet as applicable     64225 Therapeutic Activity (timed):  use of dynamic activities replicating functional movements to increase ROM, strength, coordination, balance, and proprioception in order to improve patient's ability to progress to PLOF and address remaining functional goals.   Tx Min Billable or 1:1 Min   (if diff from Tx Min) Details:   16  Pt and caregiver ed related to current status and follow up with MD PRN   09850 Therapeutic Exercise (timed):  increase ROM, strength, coordination, balance, and proprioception to improve patient's ability to progress to PLOF and address remaining functional goals.   Tx Min Billable or 1:1 Min   (if diff from Tx Min) Details:   14  See flow sheet as applicable       55  [facility] Totals Reminder: bill using total billable min of TIMED therapeutic procedures (example: do not include dry needle or estim unattended, both untimed codes, in totals to left)  8-22 min = 1 unit; 23-37 min = 2 units; 38-52 min = 3 units; 53-67 min = 4 units; 68-82 min = 5 units   Total Total     [x]  Patient Education billed concurrently with other procedures   [x] Review HEP    [] Progressed/Changed HEP, detail:    [] Other detail:       Objective Information/Functional Measures/Assessment  Pt and caregiver ed for safety with RW vs SPC for amb.  Pt with ed on progression of safety with assistance with transfers, ambulation from caregiver as needed.  Pt standing static balance with eyes open, eyes closed and min to mod A from caregiver to reduce fall risk.    Patient will continue to benefit from skilled PT / OT services to modify and progress therapeutic interventions, analyze and address functional mobility deficits, analyze and address ROM deficits, analyze and address strength deficits, analyze and address soft tissue restrictions, analyze and cue for proper movement patterns, analyze and modify for postural abnormalities, analyze and address imbalance/dizziness,

## 2024-11-15 ENCOUNTER — HOSPITAL ENCOUNTER (OUTPATIENT)
Facility: HOSPITAL | Age: 89
Setting detail: RECURRING SERIES
Discharge: HOME | End: 2024-11-18
Payer: MEDICARE

## 2024-11-15 PROCEDURE — 97110 THERAPEUTIC EXERCISES: CPT

## 2024-11-15 PROCEDURE — 97112 NEUROMUSCULAR REEDUCATION: CPT

## 2024-11-15 PROCEDURE — 97530 THERAPEUTIC ACTIVITIES: CPT

## 2024-11-15 NOTE — PROGRESS NOTES
PHYSICAL / OCCUPATIONAL THERAPY - DAILY TREATMENT NOTE    Patient Name: Viridiana Guan    Date: 11/15/2024    : 1926  Insurance: Payor: MEDICARE / Plan: MEDICARE PART A AND B / Product Type: *No Product type* /      Patient  verified Yes     Visit #   Current / Total 11 16   Time   In / Out 130p 215p   Pain   In / Out 4 2   Subjective Functional Status/Changes: R shoulder pain continues, presents with RW and rollator to address fall risk but does not know which one to use.     TREATMENT AREA =  Right shoulder pain [M25.511]     OBJECTIVE    Therapeutic Procedures:    27817 Therapeutic Exercise (timed):  increase ROM, strength, coordination, balance, and proprioception to improve patient's ability to progress to PLOF and address remaining functional goals.   Tx Min Billable or 1:1 Min   (if diff from Tx Min) Details:   20  See flow sheet as applicable     87620 Neuromuscular Re-Education (timed):  improve balance, coordination, kinesthetic sense, posture, core stability and proprioception to improve patient's ability to develop conscious control of individual muscles and awareness of position of extremities in order to progress to PLOF and address remaining functional goals.   Tx Min Billable or 1:1 Min   (if diff from Tx Min) Details:   10  See flow sheet as applicable     53114 Therapeutic Activity (timed):  use of dynamic activities replicating functional movements to increase ROM, strength, coordination, balance, and proprioception in order to improve patient's ability to progress to PLOF and address remaining functional goals.   Tx Min Billable or 1:1 Min   (if diff from Tx Min) Details:   15  See flow sheet as applicable       45  [facility] Totals Reminder: bill using total billable min of TIMED therapeutic procedures (example: do not include dry needle or estim unattended, both untimed codes, in totals to left)  8-22 min = 1 unit; 23-37 min = 2 units; 38-52 min = 3 units; 53-67 min = 4 units; 68-82 min = 5

## 2024-11-19 ENCOUNTER — HOSPITAL ENCOUNTER (OUTPATIENT)
Facility: HOSPITAL | Age: 89
Setting detail: RECURRING SERIES
Discharge: HOME | End: 2024-11-22
Payer: MEDICARE

## 2024-11-19 PROCEDURE — 97112 NEUROMUSCULAR REEDUCATION: CPT

## 2024-11-19 PROCEDURE — 97530 THERAPEUTIC ACTIVITIES: CPT

## 2024-11-19 PROCEDURE — 97140 MANUAL THERAPY 1/> REGIONS: CPT

## 2024-11-19 NOTE — PROGRESS NOTES
stability and proprioception to improve patient's ability to develop conscious control of individual muscles and awareness of position of extremities in order to progress to PLOF and address remaining functional goals.   Tx Min Billable or 1:1 Min   (if diff from Tx Min) Details:   10  See flow sheet as applicable     79153 Therapeutic Activity (timed):  use of dynamic activities replicating functional movements to increase ROM, strength, coordination, balance, and proprioception in order to improve patient's ability to progress to PLOF and address remaining functional goals.   Tx Min Billable or 1:1 Min   (if diff from Tx Min) Details:   15  See flow sheet as applicable       45  [facility] Totals Reminder: bill using total billable min of TIMED therapeutic procedures (example: do not include dry needle or estim unattended, both untimed codes, in totals to left)  8-22 min = 1 unit; 23-37 min = 2 units; 38-52 min = 3 units; 53-67 min = 4 units; 68-82 min = 5 units   Total Total     [x]  Patient Education billed concurrently with other procedures   [x] Review HEP    [] Progressed/Changed HEP, detail:    [] Other detail:       Objective Information/Functional Measures/Assessment  Pt with addition of static balance activity in parallel bars for safety.  Pt with pain during AROM, modified to AAROM for flexion and ER.    Patient will continue to benefit from skilled PT / OT services to modify and progress therapeutic interventions, analyze and address functional mobility deficits, analyze and address ROM deficits, analyze and address strength deficits, analyze and address soft tissue restrictions, analyze and cue for proper movement patterns, analyze and modify for postural abnormalities, analyze and address imbalance/dizziness, and instruct in home and community integration to address functional deficits and attain remaining goals.    Progress toward goals / Updated goals:  []  See Progress Note/Recertification    1 Patient

## 2024-11-22 ENCOUNTER — APPOINTMENT (OUTPATIENT)
Facility: HOSPITAL | Age: 88
End: 2024-11-22
Payer: MEDICARE

## 2024-11-25 ENCOUNTER — APPOINTMENT (OUTPATIENT)
Facility: HOSPITAL | Age: 88
End: 2024-11-25
Payer: MEDICARE

## 2024-11-27 ENCOUNTER — APPOINTMENT (OUTPATIENT)
Facility: HOSPITAL | Age: 88
End: 2024-11-27
Payer: MEDICARE

## 2025-07-02 ENCOUNTER — HOSPITAL ENCOUNTER (OUTPATIENT)
Facility: HOSPITAL | Age: 89
Setting detail: RECURRING SERIES
Discharge: HOME | End: 2025-07-05
Payer: MEDICARE

## 2025-07-02 PROCEDURE — 97162 PT EVAL MOD COMPLEX 30 MIN: CPT

## 2025-07-02 PROCEDURE — 97110 THERAPEUTIC EXERCISES: CPT

## 2025-07-02 NOTE — PROGRESS NOTES
PHYSICAL / OCCUPATIONAL THERAPY - DAILY TREATMENT NOTE (updated 3-25)    Patient Name: Viridiana Guan    Date: 2025    : 1926  Insurance: Payor: MEDICARE / Plan: MEDICARE PART A AND B / Product Type: *No Product type* /      Patient  verified yes     Visit #   Current / Total 1 16-24   Time   In / Out 1145 1225   Pain   In / Out 0 0   Subjective Functional Status/Changes: See Eval/ POC     TREATMENT AREA =  see POC    OBJECTIVE    Therapeutic Procedures:  Tx Min Billable or 1:1 Min (if diff from Tx Min) Procedure, Rationale, Specifics   10  59932 Therapeutic Exercise (timed):  increase ROM, strength, coordination, balance, and proprioception to improve patient's ability to progress to PLOF and address remaining functional goals. (see flow sheet as applicable)     Details if applicable:                             [facility] Totals Reminder: bill using total billable min of TIMED therapeutic procedures (example: do not include dry needle or estim unattended, both untimed codes, in totals to left)  8-22 min = 1 unit; 23-37 min = 2 units; 38-52 min = 3 units; 53-67 min = 4 units; 68-82 min = 5 units   Total Total     [x]  Patient Education billed concurrently with other procedures   [x] Review HEP    [] Progressed/Changed HEP, detail:    [] Other detail:       Objective Information/Functional Measures/Assessment: [x]  See POC    Patient will continue to benefit from skilled PT / OT services to modify and progress therapeutic interventions, analyze and address functional mobility deficits, analyze and address ROM deficits, analyze and address strength deficits, analyze and cue for proper movement patterns, analyze and address imbalance/dizziness, and instruct in home and community integration to address functional deficits and attain remaining goals.    Goals:  [x]  See POC      PLAN  yes Continue plan of care  []  Upgrade activities as tolerated  []  Discharge due to :  []  Other:    Enma Cerrato, PT

## 2025-07-02 NOTE — THERAPY EVALUATION
[facility]   [address], [address], [place] [address] Ph: [phone] Fx: [phone]  Plan of Care / Statement of Necessity for Physical Therapy Services     Patient Name: Viridiana Guan : 1926   Medical   Diagnosis: History of falling  Age-related physical debility  Other abnormalities of gait and mobility Treatment Diagnosis:   R26.89  Abnormalities of gait and mobility    Onset Date: Ongoing, worse since Aug 2024     Referral Source: Fer Saavedra MD Start of Care (SOC): 2025   Prior Hospitalization: See medical history Provider #: [id number]   Prior Level of Function: Long history of unsteadiness requiring AD since Oct 2024   Comorbidities: Arthritis, hearing impaired, pacemaker, pressure sore L foot   Viridiana Guan is a 89 y.o.  yo female with Dx of History of falling  Age-related physical debility  Other abnormalities of gait and mobility.  She presents to PT with her caregiver.  Medical history includes 3 falls back in 2024 for which she started using a can for support in Oct 2024.  She reports gradual increase of weakness particularly in the left knee for which occasionally heide.  She reports fear of falling.  She now walks with a SC but now requires the other hand to hold onto furniture, walls, or other person for additional support.  She expresses she does not want to use a walker for ambulation.  Ms. Guan has round the clock caregiver support in her home.  Objectively, the patient demonstrates  the following:  Gait: ataxic with use of SC and other person for support  LE MMT: grossly 3+ to 4/5 in Les  LE ROM: WFL except limited end range extension of left knee  LE Sensation: intact to light touch in the LEs  Balance: Decreased static balance: SLS: unable, diminished static balance assessed via PETER (score= 21/56 points)    Pt will benefit from PTto address these deficits, improve functional independence and reduce imbalance /

## 2025-07-07 ENCOUNTER — HOSPITAL ENCOUNTER (OUTPATIENT)
Facility: HOSPITAL | Age: 89
Setting detail: RECURRING SERIES
Discharge: HOME | End: 2025-07-10
Payer: MEDICARE

## 2025-07-07 PROCEDURE — 97112 NEUROMUSCULAR REEDUCATION: CPT

## 2025-07-07 PROCEDURE — 97530 THERAPEUTIC ACTIVITIES: CPT

## 2025-07-07 PROCEDURE — 97110 THERAPEUTIC EXERCISES: CPT

## 2025-07-07 NOTE — PROGRESS NOTES
PHYSICAL / OCCUPATIONAL THERAPY - DAILY TREATMENT NOTE (updated 3-25)    Patient Name: Viridiana Guan    Date: 2025    : 1926  Insurance: Payor: MEDICARE / Plan: MEDICARE PART A AND B / Product Type: *No Product type* /        Patient  verified YES   Visit #   Current / Total 2 16-24   Time   In / Out 856 939   Pain   In / Out 0 0   Subjective Functional Status/Changes: Didn't do ex's     TREATMENT AREA =  History of falling  Age-related physical debility  Other abnormalities of gait and mobility    OBJECTIVE      Therapeutic Procedures:  Tx Min Billable or 1:1 Min (if diff from Tx Min) Procedure, Rationale, Specifics   15  14617 Therapeutic Exercise (timed):  increase ROM, strength, coordination, balance, and proprioception to improve patient's ability to progress to PLOF and address remaining functional goals. (see flow sheet as applicable)    15  95019 Neuromuscular Re-Education (timed):  improve balance, coordination, kinesthetic sense, posture, core stability and proprioception to improve patient's ability to develop conscious control of individual muscles and awareness of position of extremities in order to progress to PLOF and address remaining functional goals. (see flow sheet as applicable)    13  01773 Therapeutic Activity (timed):  use of dynamic activities replicating functional movements to increase ROM, strength, coordination, balance, and proprioception in order to improve patient's ability to progress to PLOF and address remaining functional goals.  (see flow sheet as applicable)                  [facility] Totals Reminder: bill using total billable min of TIMED therapeutic procedures (example: do not include dry needle or estim unattended, both untimed codes, in totals to left)  8-22 min = 1 unit; 23-37 min = 2 units; 38-52 min = 3 units; 53-67 min = 4 units; 68-82 min = 5 units   Total Total     [x]  Patient Education billed concurrently with other procedures   [x] Review HEP    []

## 2025-07-09 ENCOUNTER — HOSPITAL ENCOUNTER (OUTPATIENT)
Facility: HOSPITAL | Age: 89
Setting detail: RECURRING SERIES
Discharge: HOME | End: 2025-07-12
Payer: MEDICARE

## 2025-07-09 PROCEDURE — 97530 THERAPEUTIC ACTIVITIES: CPT

## 2025-07-09 PROCEDURE — 97116 GAIT TRAINING THERAPY: CPT

## 2025-07-09 PROCEDURE — 97112 NEUROMUSCULAR REEDUCATION: CPT

## 2025-07-09 NOTE — PROGRESS NOTES
PHYSICAL / OCCUPATIONAL THERAPY - DAILY TREATMENT NOTE (updated 3-25)    Patient Name: Viridiana Guan    Date: 2025    : 1926  Insurance: Payor: MEDICARE / Plan: MEDICARE PART A AND B / Product Type: *No Product type* /      Patient  verified YES   Visit #   Current / Total 3 16-24   Time   In / Out 1229 138   Pain   In / Out 0 0   Subjective Functional Status/Changes: Did ok after last visit, but havent done ex's.    Reports R LBP during course of treatment       TREATMENT AREA =  History of falling  Age-related physical debility  Other abnormalities of gait and mobility    OBJECTIVE    Modalities Rationale:     decrease pain and increase tissue extensibility to improve patient's ability to progress to PLOF and address remaining functional goals.     min [] Estim Unattended, type/location:                                     []  w/US     []  w/ice    []  w/heat    []  TENS insruct      min []  Mechanical Traction: type/lbs                   []  pro   []  sup   []  int   []  cont    []  before manual    []  after manual    min []  Ultrasound, settings/location:      min []  Iontophoresis w/ dexamethasone, location:                                               []  take home patch       []  in clinic   10 min  unbilled []  Ice     [x]  Heat    location/position: R LB in sidelying-    min []  Paraffin,  details:     min []  Vasopneumatic Device, press/temp:     min []  Whirlpool / Fluido:    If using vaso (only need to measure limb vaso being performed on)      pre-treatment girth :       post-treatment girth :       measured at (landmark location) :      min []  Other:    [x] Skin assessment post-treatment (if applicable):    [x]  intact    []  redness- no adverse reaction                 []redness - adverse reaction:        Therapeutic Procedures:  Tx Min Billable or 1:1 Min (if diff from Tx Min) Procedure, Rationale, Specifics   15 - 73764 Therapeutic Exercise (timed):  increase ROM, strength,

## 2025-07-11 ENCOUNTER — HOSPITAL ENCOUNTER (OUTPATIENT)
Facility: HOSPITAL | Age: 89
Setting detail: RECURRING SERIES
Discharge: HOME | End: 2025-07-14
Payer: MEDICARE

## 2025-07-11 PROCEDURE — 97112 NEUROMUSCULAR REEDUCATION: CPT

## 2025-07-11 PROCEDURE — 97530 THERAPEUTIC ACTIVITIES: CPT

## 2025-07-11 PROCEDURE — 97110 THERAPEUTIC EXERCISES: CPT

## 2025-07-11 NOTE — PROGRESS NOTES
PHYSICAL / OCCUPATIONAL THERAPY - DAILY TREATMENT NOTE (updated 3-25)    Patient Name: Viridiana Guan    Date: 2025    : 1926  Insurance: Payor: MEDICARE / Plan: MEDICARE PART A AND B / Product Type: *No Product type* /      Patient  verified YES   Visit #   Current / Total 4 16-24   Time   In / Out 11am 1155am   Pain   In / Out 0 0   Subjective Functional Status/Changes: Pt states she is unable to walk without assistance at home and has concerns about falling. She is not doing as well with exercises on her own at home but is trying.       TREATMENT AREA =  History of falling  Age-related physical debility  Other abnormalities of gait and mobility    OBJECTIVE    Modalities Rationale:     decrease pain and increase tissue extensibility to improve patient's ability to progress to PLOF and address remaining functional goals.     min [] Estim Unattended, type/location:                                     []  w/US     []  w/ice    []  w/heat    []  TENS insruct      min []  Mechanical Traction: type/lbs                   []  pro   []  sup   []  int   []  cont    []  before manual    []  after manual    min []  Ultrasound, settings/location:      min []  Iontophoresis w/ dexamethasone, location:                                               []  take home patch       []  in clinic   10 min  unbilled []  Ice     [x]  Heat    location/position: Supine to lumbar region    min []  Paraffin,  details:     min []  Vasopneumatic Device, press/temp:     min []  Whirlpool / Fluido:    If using vaso (only need to measure limb vaso being performed on)      pre-treatment girth :       post-treatment girth :       measured at (landmark location) :      min []  Other:    [x] Skin assessment post-treatment (if applicable):    [x]  intact    []  redness- no adverse reaction                 []redness - adverse reaction:        Therapeutic Procedures:  Tx Min Billable or 1:1 Min (if diff from Tx Min) Procedure, Rationale,

## 2025-07-14 ENCOUNTER — HOSPITAL ENCOUNTER (OUTPATIENT)
Facility: HOSPITAL | Age: 89
Setting detail: RECURRING SERIES
Discharge: HOME | End: 2025-07-17
Payer: MEDICARE

## 2025-07-14 PROCEDURE — 97112 NEUROMUSCULAR REEDUCATION: CPT

## 2025-07-14 PROCEDURE — 97110 THERAPEUTIC EXERCISES: CPT

## 2025-07-14 PROCEDURE — 97530 THERAPEUTIC ACTIVITIES: CPT

## 2025-07-14 NOTE — PROGRESS NOTES
PHYSICAL / OCCUPATIONAL THERAPY - DAILY TREATMENT NOTE (updated 3-25)    Patient Name: Viridiana Guan    Date: 2025    : 1926  Insurance: Payor: MEDICARE / Plan: MEDICARE PART A AND B / Product Type: *No Product type* /      Patient  verified YES   Visit #   Current / Total 5 16-24   Time   In / Out 11am 1155am   Pain   In / Out 0 0   Subjective Functional Status/Changes: Pt states she has help even on the weekend, someone is with her all the time now as she is needing more help with getting up for toileting.       TREATMENT AREA =  History of falling  Age-related physical debility  Other abnormalities of gait and mobility    OBJECTIVE    Modalities Rationale:     decrease pain and increase tissue extensibility to improve patient's ability to progress to PLOF and address remaining functional goals.     min [] Estim Unattended, type/location:                                     []  w/US     []  w/ice    []  w/heat    []  TENS insruct      min []  Mechanical Traction: type/lbs                   []  pro   []  sup   []  int   []  cont    []  before manual    []  after manual    min []  Ultrasound, settings/location:      min []  Iontophoresis w/ dexamethasone, location:                                               []  take home patch       []  in clinic   10 min  unbilled []  Ice     [x]  Heat    location/position: Supine to lumbar region    min []  Paraffin,  details:     min []  Vasopneumatic Device, press/temp:     min []  Whirlpool / Fluido:    If using vaso (only need to measure limb vaso being performed on)      pre-treatment girth :       post-treatment girth :       measured at (landmark location) :      min []  Other:    [x] Skin assessment post-treatment (if applicable):    [x]  intact    []  redness- no adverse reaction                 []redness - adverse reaction:        Therapeutic Procedures:  Tx Min Billable or 1:1 Min (if diff from Tx Min) Procedure, Rationale, Specifics   15 - 00711

## 2025-07-16 ENCOUNTER — HOSPITAL ENCOUNTER (OUTPATIENT)
Facility: HOSPITAL | Age: 89
Setting detail: RECURRING SERIES
Discharge: HOME | End: 2025-07-19
Payer: MEDICARE

## 2025-07-16 PROCEDURE — 97530 THERAPEUTIC ACTIVITIES: CPT

## 2025-07-16 PROCEDURE — 97110 THERAPEUTIC EXERCISES: CPT

## 2025-07-16 PROCEDURE — 97112 NEUROMUSCULAR REEDUCATION: CPT

## 2025-07-16 NOTE — PROGRESS NOTES
PHYSICAL / OCCUPATIONAL THERAPY - DAILY TREATMENT NOTE (updated 3-25)    Patient Name: Viridiana Guan    Date: 2025    : 1926  Insurance: Payor: MEDICARE / Plan: MEDICARE PART A AND B / Product Type: *No Product type* /      Patient  verified YES   Visit #   Current / Total 6 16-24   Time   In / Out 945 1035   Pain   In / Out 0 0   Subjective Functional Status/Changes: Pt states she has been trying to work on her balance and strengthening. Would like to have an updated sheet.       TREATMENT AREA =  History of falling  Age-related physical debility  Other abnormalities of gait and mobility    OBJECTIVE    Modalities Rationale:     decrease pain and increase tissue extensibility to improve patient's ability to progress to PLOF and address remaining functional goals.     min [] Estim Unattended, type/location:                                     []  w/US     []  w/ice    []  w/heat    []  TENS insruct      min []  Mechanical Traction: type/lbs                   []  pro   []  sup   []  int   []  cont    []  before manual    []  after manual    min []  Ultrasound, settings/location:      min []  Iontophoresis w/ dexamethasone, location:                                               []  take home patch       []  in clinic   10 min  unbilled []  Ice     [x]  Heat    location/position: Supine to lumbar region    min []  Paraffin,  details:     min []  Vasopneumatic Device, press/temp:     min []  Whirlpool / Fluido:    If using vaso (only need to measure limb vaso being performed on)      pre-treatment girth :       post-treatment girth :       measured at (landmark location) :      min []  Other:    [x] Skin assessment post-treatment (if applicable):    [x]  intact    []  redness- no adverse reaction                 []redness - adverse reaction:        Therapeutic Procedures:  Tx Min Billable or 1:1 Min (if diff from Tx Min) Procedure, Rationale, Specifics   62 - 71305 Therapeutic Exercise (timed):  increase

## 2025-07-18 ENCOUNTER — HOSPITAL ENCOUNTER (OUTPATIENT)
Facility: HOSPITAL | Age: 89
Setting detail: RECURRING SERIES
Discharge: HOME | End: 2025-07-21
Payer: MEDICARE

## 2025-07-18 PROCEDURE — 97530 THERAPEUTIC ACTIVITIES: CPT

## 2025-07-18 PROCEDURE — 97112 NEUROMUSCULAR REEDUCATION: CPT

## 2025-07-18 PROCEDURE — 97110 THERAPEUTIC EXERCISES: CPT

## 2025-07-18 NOTE — PROGRESS NOTES
PHYSICAL / OCCUPATIONAL THERAPY - DAILY TREATMENT NOTE (updated 3-25)    Patient Name: Viridiana Guan    Date: 2025    : 1926  Insurance: Payor: MEDICARE / Plan: MEDICARE PART A AND B / Product Type: *No Product type* /      Patient  verified YES   Visit #   Current / Total 7 16-24   Time   In / Out 945 1035   Pain   In / Out 0 0   Subjective Functional Status/Changes: Pt states she has been feeling weaker this am, did not get to do exercises since last visit.       TREATMENT AREA =  History of falling  Age-related physical debility  Other abnormalities of gait and mobility    OBJECTIVE    Modalities Rationale:     decrease pain and increase tissue extensibility to improve patient's ability to progress to PLOF and address remaining functional goals.     min [] Estim Unattended, type/location:                                     []  w/US     []  w/ice    []  w/heat    []  TENS insruct      min []  Mechanical Traction: type/lbs                   []  pro   []  sup   []  int   []  cont    []  before manual    []  after manual    min []  Ultrasound, settings/location:      min []  Iontophoresis w/ dexamethasone, location:                                               []  take home patch       []  in clinic   10 min  unbilled []  Ice     [x]  Heat    location/position: Supine to lumbar region    min []  Paraffin,  details:     min []  Vasopneumatic Device, press/temp:     min []  Whirlpool / Fluido:    If using vaso (only need to measure limb vaso being performed on)      pre-treatment girth :       post-treatment girth :       measured at (landmark location) :      min []  Other:    [x] Skin assessment post-treatment (if applicable):    [x]  intact    []  redness- no adverse reaction                 []redness - adverse reaction:        Therapeutic Procedures:  Tx Min Billable or 1:1 Min (if diff from Tx Min) Procedure, Rationale, Specifics   15  65117 Therapeutic Exercise (timed):  increase ROM, strength,

## 2025-07-21 ENCOUNTER — HOSPITAL ENCOUNTER (OUTPATIENT)
Facility: HOSPITAL | Age: 89
Setting detail: RECURRING SERIES
End: 2025-07-21
Payer: MEDICARE

## 2025-07-23 ENCOUNTER — HOSPITAL ENCOUNTER (OUTPATIENT)
Facility: HOSPITAL | Age: 89
Setting detail: RECURRING SERIES
Discharge: HOME | End: 2025-07-26
Payer: MEDICARE

## 2025-07-23 PROCEDURE — 97112 NEUROMUSCULAR REEDUCATION: CPT

## 2025-07-23 PROCEDURE — 97530 THERAPEUTIC ACTIVITIES: CPT

## 2025-07-23 PROCEDURE — 97110 THERAPEUTIC EXERCISES: CPT

## 2025-07-23 NOTE — PROGRESS NOTES
PHYSICAL / OCCUPATIONAL THERAPY - DAILY TREATMENT NOTE (updated 3-25)    Patient Name: Viridiana Guan    Date: 2025    : 1926  Insurance: Payor: MEDICARE / Plan: MEDICARE PART A AND B / Product Type: *No Product type* /      Patient  verified YES   Visit #   Current / Total 7 24   Time   In / Out 1020 1110am   Pain   In / Out 0 0   Subjective Functional Status/Changes: Pt states she is tired this am, had no increase in symptoms with last visit.       TREATMENT AREA =  History of falling  Age-related physical debility  Other abnormalities of gait and mobility    OBJECTIVE    Modalities Rationale:     decrease pain and increase tissue extensibility to improve patient's ability to progress to PLOF and address remaining functional goals.     min [] Estim Unattended, type/location:                                     []  w/US     []  w/ice    []  w/heat    []  TENS insruct      min []  Mechanical Traction: type/lbs                   []  pro   []  sup   []  int   []  cont    []  before manual    []  after manual    min []  Ultrasound, settings/location:      min []  Iontophoresis w/ dexamethasone, location:                                               []  take home patch       []  in clinic   10 min  unbilled []  Ice     [x]  Heat    location/position: Supine to low back    min []  Paraffin,  details:     min []  Vasopneumatic Device, press/temp:     min []  Whirlpool / Fluido:    If using vaso (only need to measure limb vaso being performed on)      pre-treatment girth :       post-treatment girth :       measured at (landmark location) :      min []  Other:    [x] Skin assessment post-treatment (if applicable):    [x]  intact    []  redness- no adverse reaction                 []redness - adverse reaction:        Therapeutic Procedures:  Tx Min Billable or 1:1 Min (if diff from Tx Min) Procedure, Rationale, Specifics   15  61598 Therapeutic Exercise (timed):  increase ROM, strength, coordination, balance,

## 2025-07-25 ENCOUNTER — HOSPITAL ENCOUNTER (OUTPATIENT)
Facility: HOSPITAL | Age: 89
Setting detail: RECURRING SERIES
Discharge: HOME | End: 2025-07-28
Payer: MEDICARE

## 2025-07-25 PROCEDURE — 97112 NEUROMUSCULAR REEDUCATION: CPT

## 2025-07-25 PROCEDURE — 97110 THERAPEUTIC EXERCISES: CPT

## 2025-07-25 PROCEDURE — 97530 THERAPEUTIC ACTIVITIES: CPT

## 2025-07-25 NOTE — PROGRESS NOTES
PHYSICAL / OCCUPATIONAL THERAPY - DAILY TREATMENT NOTE    Patient Name: Viridiana Guan    Date: 2025    : 1926  Insurance: Payor: MEDICARE / Plan: MEDICARE PART A AND B / Product Type: *No Product type* /      Patient  verified Yes     Visit #   Current / Total 8 24   Time   In / Out 1100 1150   Pain   In / Out 0 0   Subjective Functional Status/Changes: \"I wish I could make myself do my exercises everyday. I did do them yesterday.\"     TREATMENT AREA =  History of falling  Age-related physical debility  Other abnormalities of gait and mobility    OBJECTIVE    Heat (UNBILLED):  location/position: Supine to L/S .    Min Rationale   10 decrease pain and increase tissue extensibility to improve patient's ability to progress to PLOF and address remaining functional goals.     Skin assessment post-treatment:   Intact     Therapeutic Procedures:    91452 Therapeutic Exercise (timed):  increase ROM, strength, coordination, balance, and proprioception to improve patient's ability to progress to PLOF and address remaining functional goals.   Tx Min Billable or 1:1 Min   (if diff from Tx Min) Details:   20  See flow sheet as applicable     86240 Neuromuscular Re-Education (timed):  improve balance, coordination, kinesthetic sense, posture, core stability and proprioception to improve patient's ability to develop conscious control of individual muscles and awareness of position of extremities in order to progress to PLOF and address remaining functional goals.   Tx Min Billable or 1:1 Min   (if diff from Tx Min) Details:   10  See flow sheet as applicable     48394 Therapeutic Activity (timed):  use of dynamic activities replicating functional movements to increase ROM, strength, coordination, balance, and proprioception in order to improve patient's ability to progress to PLOF and address remaining functional goals.   Tx Min Billable or 1:1 Min   (if diff from Tx Min) Details:   10  See flow sheet as applicable

## 2025-07-28 ENCOUNTER — HOSPITAL ENCOUNTER (OUTPATIENT)
Facility: HOSPITAL | Age: 89
Setting detail: RECURRING SERIES
Discharge: HOME | End: 2025-07-31
Payer: MEDICARE

## 2025-07-28 PROCEDURE — 97110 THERAPEUTIC EXERCISES: CPT

## 2025-07-28 PROCEDURE — 97530 THERAPEUTIC ACTIVITIES: CPT

## 2025-07-28 PROCEDURE — 97112 NEUROMUSCULAR REEDUCATION: CPT

## 2025-07-28 NOTE — THERAPY RECERTIFICATION
[facility]  [address]  [address]  [facility] [address]  Phone: [phone]  Fax: [phone]    PROGRESS NOTE    Patient Name: Viridiana Guan : 1926   Treatment/Medical Diagnosis: History of falling  Age-related physical debility  Other abnormalities of gait and mobility   Referral Source: Fer Saavedra MD   Payor: Payor: MEDICARE / Plan: MEDICARE PART A AND B / Product Type: *No Product type* /    Date of Initial Visit: 25 Attended Visits: 10 Missed Visits: 0     SUMMARY OF TREATMENT  Pt is 89 y/o female  who has been seen for 10 PT visits since IE w/ focus of care on improving flexibility and strength of the lower quadrant to improve functional performance and safety in home and community. Pt has been making steady progress with improvements noted in the following areas: Peter and LE strength. Based on current level of progress anticipate that Pt will meet goals within the next 60 days. However, current barriers of poor carry over w/ HEP at home, walking at home, and safety considerations w/ safe transfers at home may delay progress.     CURRENT STATUS  Functional Gains: Not sure anything is better  Functional Deficits: I'm not walking enough  % improvement: 20  Patient Goal: \"I need to do my ex's\"    Testing:  Peter/56  Gait: ataxic with use of SC and other person for support  LE MMT: grossly 4- to 4/5 in Les  Balance: Decreased static balance: SLS: unable, diminished static balance assessed via PETER (score= 31/56 points)  Short Term Goals: To be accomplished in 4 weeks  1. Patient will report at least 25% reduction of symptoms with ADLs.  PN: Pt reporting 20% improvement 25   2. Patient will be independent and complaint with HEP TID to reduce imbalance and dizziness with ADLs.   Current: Fair compliance 25  3. PETER will improve to greater than or equal to 23/56 points to demonstrate reduction of imbalance with ADLs--good progress as noted with improving performance with

## 2025-07-28 NOTE — PROGRESS NOTES
PHYSICAL / OCCUPATIONAL THERAPY - DAILY TREATMENT NOTE    Patient Name: Viridiana Guan    Date: 2025    : 1926  Insurance: Payor: MEDICARE / Plan: MEDICARE PART A AND B / Product Type: *No Product type* /      Patient  verified Yes     Visit #   Current / Total 10 24   Time   In / Out 3:00 345   Pain   In / Out 0 0   Subjective Functional Status/Changes: \"I'm really not doing my exercises\" Agrees to try to get up and walk to get her drinks through the day instead of having caregivers bring them. Admits to using towel rack to pull herself up from the commode  Functional Gains: Not sure anything is better  Functional Deficits: I'm not walking enough  % improvement: 20  Patient Goal: \"I need to do my ex's\"      TREATMENT AREA =  History of falling  Age-related physical debility  Other abnormalities of gait and mobility    OBJECTIVE    Heat (UNBILLED):  location/position: Supine to L/S .    Min Rationale   X decrease pain and increase tissue extensibility to improve patient's ability to progress to PLOF and address remaining functional goals.     Skin assessment post-treatment:   Intact     Therapeutic Procedures:    64680 Therapeutic Exercise (timed):  increase ROM, strength, coordination, balance, and proprioception to improve patient's ability to progress to PLOF and address remaining functional goals.   Tx Min Billable or 1:1 Min   (if diff from Tx Min) Details:   15  See flow sheet as applicable     52219 Neuromuscular Re-Education (timed):  improve balance, coordination, kinesthetic sense, posture, core stability and proprioception to improve patient's ability to develop conscious control of individual muscles and awareness of position of extremities in order to progress to PLOF and address remaining functional goals.   Tx Min Billable or 1:1 Min   (if diff from Tx Min) Details:   15  See flow sheet as applicable     05418 Therapeutic Activity (timed):  use of dynamic activities replicating functional

## 2025-07-30 ENCOUNTER — HOSPITAL ENCOUNTER (OUTPATIENT)
Facility: HOSPITAL | Age: 89
Setting detail: RECURRING SERIES
Discharge: HOME | End: 2025-08-02
Payer: MEDICARE

## 2025-07-30 PROCEDURE — 97530 THERAPEUTIC ACTIVITIES: CPT

## 2025-07-30 PROCEDURE — 97110 THERAPEUTIC EXERCISES: CPT

## 2025-07-30 PROCEDURE — 97112 NEUROMUSCULAR REEDUCATION: CPT

## 2025-07-30 NOTE — PROGRESS NOTES
PHYSICAL / OCCUPATIONAL THERAPY - DAILY TREATMENT NOTE    Patient Name: Viridiana Guan    Date: 2025    : 1926  Insurance: Payor: MEDICARE / Plan: MEDICARE PART A AND B / Product Type: *No Product type* /      Patient  verified Yes     Visit #   Current / Total 11 24   Time   In / Out 930 1025a   Pain   In / Out 0 0   Subjective Functional Status/Changes: Working on doing her exercises, her son in law got her a ball and her band has been updated.     TREATMENT AREA =  History of falling  Age-related physical debility  Other abnormalities of gait and mobility    OBJECTIVE    Heat (UNBILLED):  location/position: Supine to L/S .    Min Rationale   10 decrease pain and increase tissue extensibility to improve patient's ability to progress to PLOF and address remaining functional goals.     Skin assessment post-treatment:   Intact     Therapeutic Procedures:    60251 Therapeutic Exercise (timed):  increase ROM, strength, coordination, balance, and proprioception to improve patient's ability to progress to PLOF and address remaining functional goals.   Tx Min Billable or 1:1 Min   (if diff from Tx Min) Details:   15  See flow sheet as applicable     66220 Neuromuscular Re-Education (timed):  improve balance, coordination, kinesthetic sense, posture, core stability and proprioception to improve patient's ability to develop conscious control of individual muscles and awareness of position of extremities in order to progress to PLOF and address remaining functional goals.   Tx Min Billable or 1:1 Min   (if diff from Tx Min) Details:   15  See flow sheet as applicable     52717 Therapeutic Activity (timed):  use of dynamic activities replicating functional movements to increase ROM, strength, coordination, balance, and proprioception in order to improve patient's ability to progress to PLOF and address remaining functional goals.   Tx Min Billable or 1:1 Min   (if diff from Tx Min) Details:   15  See flow sheet as

## 2025-08-01 ENCOUNTER — HOSPITAL ENCOUNTER (OUTPATIENT)
Facility: HOSPITAL | Age: 89
Setting detail: RECURRING SERIES
Discharge: HOME | End: 2025-08-04
Payer: MEDICARE

## 2025-08-01 PROCEDURE — 97530 THERAPEUTIC ACTIVITIES: CPT

## 2025-08-01 PROCEDURE — 97110 THERAPEUTIC EXERCISES: CPT

## 2025-08-01 PROCEDURE — 97112 NEUROMUSCULAR REEDUCATION: CPT

## 2025-08-06 ENCOUNTER — HOSPITAL ENCOUNTER (OUTPATIENT)
Facility: HOSPITAL | Age: 89
Setting detail: RECURRING SERIES
Discharge: HOME | End: 2025-08-09
Payer: MEDICARE

## 2025-08-06 PROCEDURE — 97112 NEUROMUSCULAR REEDUCATION: CPT

## 2025-08-06 PROCEDURE — 97530 THERAPEUTIC ACTIVITIES: CPT

## 2025-08-06 PROCEDURE — 97110 THERAPEUTIC EXERCISES: CPT

## 2025-08-08 ENCOUNTER — HOSPITAL ENCOUNTER (OUTPATIENT)
Facility: HOSPITAL | Age: 89
Setting detail: RECURRING SERIES
Discharge: HOME | End: 2025-08-11
Payer: MEDICARE

## 2025-08-08 PROCEDURE — 97530 THERAPEUTIC ACTIVITIES: CPT

## 2025-08-08 PROCEDURE — 97112 NEUROMUSCULAR REEDUCATION: CPT

## 2025-08-08 PROCEDURE — 97110 THERAPEUTIC EXERCISES: CPT

## 2025-08-13 ENCOUNTER — HOSPITAL ENCOUNTER (OUTPATIENT)
Facility: HOSPITAL | Age: 89
Setting detail: RECURRING SERIES
Discharge: HOME | End: 2025-08-16
Payer: MEDICARE

## 2025-08-13 PROCEDURE — 97530 THERAPEUTIC ACTIVITIES: CPT

## 2025-08-13 PROCEDURE — 97112 NEUROMUSCULAR REEDUCATION: CPT

## 2025-08-13 PROCEDURE — 97110 THERAPEUTIC EXERCISES: CPT

## 2025-08-15 ENCOUNTER — HOSPITAL ENCOUNTER (OUTPATIENT)
Facility: HOSPITAL | Age: 89
Setting detail: RECURRING SERIES
Discharge: HOME | End: 2025-08-18
Payer: MEDICARE

## 2025-08-15 PROCEDURE — 97530 THERAPEUTIC ACTIVITIES: CPT

## 2025-08-15 PROCEDURE — 97112 NEUROMUSCULAR REEDUCATION: CPT

## 2025-08-20 ENCOUNTER — HOSPITAL ENCOUNTER (OUTPATIENT)
Facility: HOSPITAL | Age: 89
Setting detail: RECURRING SERIES
Discharge: HOME | End: 2025-08-23
Payer: MEDICARE

## 2025-08-20 PROCEDURE — 97530 THERAPEUTIC ACTIVITIES: CPT

## 2025-08-20 PROCEDURE — 97112 NEUROMUSCULAR REEDUCATION: CPT

## 2025-08-20 PROCEDURE — 97110 THERAPEUTIC EXERCISES: CPT

## 2025-08-22 ENCOUNTER — HOSPITAL ENCOUNTER (OUTPATIENT)
Facility: HOSPITAL | Age: 89
Setting detail: RECURRING SERIES
Discharge: HOME | End: 2025-08-25
Payer: MEDICARE

## 2025-08-22 PROCEDURE — 97112 NEUROMUSCULAR REEDUCATION: CPT

## 2025-08-22 PROCEDURE — 97110 THERAPEUTIC EXERCISES: CPT

## 2025-08-22 PROCEDURE — 97530 THERAPEUTIC ACTIVITIES: CPT

## 2025-08-27 ENCOUNTER — HOSPITAL ENCOUNTER (OUTPATIENT)
Facility: HOSPITAL | Age: 89
Setting detail: RECURRING SERIES
Discharge: HOME | End: 2025-08-30
Payer: MEDICARE

## 2025-08-27 PROCEDURE — 97530 THERAPEUTIC ACTIVITIES: CPT

## 2025-08-27 PROCEDURE — 97110 THERAPEUTIC EXERCISES: CPT

## 2025-08-27 PROCEDURE — 97112 NEUROMUSCULAR REEDUCATION: CPT

## 2025-08-29 ENCOUNTER — HOSPITAL ENCOUNTER (OUTPATIENT)
Facility: HOSPITAL | Age: 89
Setting detail: RECURRING SERIES
Discharge: HOME | End: 2025-09-01
Payer: MEDICARE

## 2025-08-29 ENCOUNTER — APPOINTMENT (OUTPATIENT)
Facility: HOSPITAL | Age: 89
End: 2025-08-29
Payer: MEDICARE

## 2025-08-29 PROCEDURE — 97530 THERAPEUTIC ACTIVITIES: CPT

## 2025-08-29 PROCEDURE — 97110 THERAPEUTIC EXERCISES: CPT

## 2025-08-29 PROCEDURE — 97112 NEUROMUSCULAR REEDUCATION: CPT
